# Patient Record
Sex: MALE | Race: WHITE | Employment: UNEMPLOYED | URBAN - METROPOLITAN AREA
[De-identification: names, ages, dates, MRNs, and addresses within clinical notes are randomized per-mention and may not be internally consistent; named-entity substitution may affect disease eponyms.]

---

## 2021-02-19 ENCOUNTER — HOSPITAL ENCOUNTER (INPATIENT)
Age: 51
LOS: 4 days | Discharge: HOME | DRG: 885 | End: 2021-02-23
Attending: PSYCHIATRY & NEUROLOGY | Admitting: PSYCHIATRY & NEUROLOGY
Payer: MEDICARE

## 2021-02-19 PROBLEM — F32.9 MAJOR DEPRESSIVE DISORDER: Status: ACTIVE | Noted: 2021-02-19

## 2021-02-19 LAB
GLUCOSE BLD STRIP.AUTO-MCNC: 167 MG/DL (ref 65–100)
SERVICE CMNT-IMP: ABNORMAL

## 2021-02-19 PROCEDURE — 2709999900 HC NON-CHARGEABLE SUPPLY

## 2021-02-19 PROCEDURE — 5A1D70Z PERFORMANCE OF URINARY FILTRATION, INTERMITTENT, LESS THAN 6 HOURS PER DAY: ICD-10-PCS | Performed by: INTERNAL MEDICINE

## 2021-02-19 PROCEDURE — 74011250636 HC RX REV CODE- 250/636: Performed by: INTERNAL MEDICINE

## 2021-02-19 PROCEDURE — 74011636637 HC RX REV CODE- 636/637: Performed by: NURSE PRACTITIONER

## 2021-02-19 PROCEDURE — 82962 GLUCOSE BLOOD TEST: CPT

## 2021-02-19 PROCEDURE — 74011250637 HC RX REV CODE- 250/637: Performed by: NURSE PRACTITIONER

## 2021-02-19 PROCEDURE — P9047 ALBUMIN (HUMAN), 25%, 50ML: HCPCS | Performed by: INTERNAL MEDICINE

## 2021-02-19 PROCEDURE — 65220000003 HC RM SEMIPRIVATE PSYCH

## 2021-02-19 PROCEDURE — U0003 INFECTIOUS AGENT DETECTION BY NUCLEIC ACID (DNA OR RNA); SEVERE ACUTE RESPIRATORY SYNDROME CORONAVIRUS 2 (SARS-COV-2) (CORONAVIRUS DISEASE [COVID-19]), AMPLIFIED PROBE TECHNIQUE, MAKING USE OF HIGH THROUGHPUT TECHNOLOGIES AS DESCRIBED BY CMS-2020-01-R: HCPCS

## 2021-02-19 PROCEDURE — 90935 HEMODIALYSIS ONE EVALUATION: CPT

## 2021-02-19 PROCEDURE — 74011250637 HC RX REV CODE- 250/637: Performed by: PSYCHIATRY & NEUROLOGY

## 2021-02-19 RX ORDER — ASPIRIN 81 MG/1
81 TABLET ORAL DAILY
COMMUNITY

## 2021-02-19 RX ORDER — TORSEMIDE 100 MG/1
100 TABLET ORAL DAILY
Status: DISCONTINUED | OUTPATIENT
Start: 2021-02-20 | End: 2021-02-23 | Stop reason: HOSPADM

## 2021-02-19 RX ORDER — DIPHENHYDRAMINE HYDROCHLORIDE 50 MG/ML
50 INJECTION, SOLUTION INTRAMUSCULAR; INTRAVENOUS
Status: DISCONTINUED | OUTPATIENT
Start: 2021-02-19 | End: 2021-02-23 | Stop reason: HOSPADM

## 2021-02-19 RX ORDER — METOPROLOL TARTRATE 25 MG/1
12.5 TABLET, FILM COATED ORAL 2 TIMES DAILY
COMMUNITY

## 2021-02-19 RX ORDER — SENNOSIDES 8.6 MG/1
1 TABLET ORAL DAILY
Status: DISCONTINUED | OUTPATIENT
Start: 2021-02-20 | End: 2021-02-23 | Stop reason: HOSPADM

## 2021-02-19 RX ORDER — GABAPENTIN 100 MG/1
100 CAPSULE ORAL 2 TIMES DAILY
COMMUNITY

## 2021-02-19 RX ORDER — MIDODRINE HYDROCHLORIDE 5 MG/1
10 TABLET ORAL
Status: DISCONTINUED | OUTPATIENT
Start: 2021-02-19 | End: 2021-02-23 | Stop reason: HOSPADM

## 2021-02-19 RX ORDER — METOPROLOL TARTRATE 25 MG/1
12.5 TABLET, FILM COATED ORAL 2 TIMES DAILY
Status: DISCONTINUED | OUTPATIENT
Start: 2021-02-20 | End: 2021-02-23 | Stop reason: HOSPADM

## 2021-02-19 RX ORDER — HYDROXYZINE 50 MG/1
50 TABLET, FILM COATED ORAL
Status: DISCONTINUED | OUTPATIENT
Start: 2021-02-19 | End: 2021-02-23 | Stop reason: HOSPADM

## 2021-02-19 RX ORDER — OLANZAPINE 5 MG/1
5 TABLET ORAL
Status: DISCONTINUED | OUTPATIENT
Start: 2021-02-19 | End: 2021-02-23 | Stop reason: HOSPADM

## 2021-02-19 RX ORDER — ADHESIVE BANDAGE
30 BANDAGE TOPICAL DAILY PRN
Status: DISCONTINUED | OUTPATIENT
Start: 2021-02-19 | End: 2021-02-23 | Stop reason: HOSPADM

## 2021-02-19 RX ORDER — BENZTROPINE MESYLATE 1 MG/1
1 TABLET ORAL
Status: DISCONTINUED | OUTPATIENT
Start: 2021-02-19 | End: 2021-02-23 | Stop reason: HOSPADM

## 2021-02-19 RX ORDER — ACETAMINOPHEN 500 MG
1000 TABLET ORAL
COMMUNITY
End: 2021-02-23

## 2021-02-19 RX ORDER — DICLOFENAC SODIUM 10 MG/G
2 GEL TOPICAL 4 TIMES DAILY
COMMUNITY

## 2021-02-19 RX ORDER — ASPIRIN 81 MG/1
81 TABLET ORAL DAILY
Status: DISCONTINUED | OUTPATIENT
Start: 2021-02-20 | End: 2021-02-23 | Stop reason: HOSPADM

## 2021-02-19 RX ORDER — INSULIN LISPRO 100 [IU]/ML
INJECTION, SOLUTION INTRAVENOUS; SUBCUTANEOUS
Status: DISCONTINUED | OUTPATIENT
Start: 2021-02-20 | End: 2021-02-23 | Stop reason: HOSPADM

## 2021-02-19 RX ORDER — HYDROCORTISONE 10 MG/1
5 TABLET ORAL EVERY EVENING
Status: DISCONTINUED | OUTPATIENT
Start: 2021-02-20 | End: 2021-02-23 | Stop reason: HOSPADM

## 2021-02-19 RX ORDER — HYDROCORTISONE 10 MG/1
10 TABLET ORAL DAILY
COMMUNITY

## 2021-02-19 RX ORDER — ATORVASTATIN CALCIUM 40 MG/1
80 TABLET, FILM COATED ORAL DAILY
Status: DISCONTINUED | OUTPATIENT
Start: 2021-02-20 | End: 2021-02-23 | Stop reason: HOSPADM

## 2021-02-19 RX ORDER — HYDROCORTISONE 10 MG/1
10 TABLET ORAL DAILY
Status: DISCONTINUED | OUTPATIENT
Start: 2021-02-20 | End: 2021-02-23 | Stop reason: HOSPADM

## 2021-02-19 RX ORDER — CALCIUM ACETATE 667 MG/1
2001 TABLET ORAL
Status: DISCONTINUED | OUTPATIENT
Start: 2021-02-20 | End: 2021-02-23 | Stop reason: HOSPADM

## 2021-02-19 RX ORDER — TESTOSTERONE 25 MG/2.5G
2.5 GEL TRANSDERMAL DAILY
COMMUNITY
End: 2021-02-23

## 2021-02-19 RX ORDER — ACETAMINOPHEN 325 MG/1
650 TABLET ORAL
Status: DISCONTINUED | OUTPATIENT
Start: 2021-02-19 | End: 2021-02-23 | Stop reason: HOSPADM

## 2021-02-19 RX ORDER — MIDODRINE HYDROCHLORIDE 10 MG/1
TABLET ORAL
COMMUNITY

## 2021-02-19 RX ORDER — ALBUMIN HUMAN 250 G/1000ML
12.5 SOLUTION INTRAVENOUS
Status: DISCONTINUED | OUTPATIENT
Start: 2021-02-19 | End: 2021-02-23 | Stop reason: HOSPADM

## 2021-02-19 RX ORDER — B,C/FOLIC/ZINC/SELENOMETH/D3/E 0.8-12.5MG
1 TABLET ORAL DAILY
COMMUNITY

## 2021-02-19 RX ORDER — ESCITALOPRAM OXALATE 20 MG/1
20 TABLET ORAL DAILY
Status: ON HOLD | COMMUNITY
End: 2021-02-22

## 2021-02-19 RX ORDER — ZOLPIDEM TARTRATE 5 MG/1
TABLET ORAL
COMMUNITY
End: 2021-02-23

## 2021-02-19 RX ORDER — TRAZODONE HYDROCHLORIDE 50 MG/1
50 TABLET ORAL
Status: DISCONTINUED | OUTPATIENT
Start: 2021-02-19 | End: 2021-02-23 | Stop reason: HOSPADM

## 2021-02-19 RX ORDER — PROMETHAZINE HYDROCHLORIDE 25 MG/1
25 TABLET ORAL
Status: ON HOLD | COMMUNITY
End: 2021-02-22

## 2021-02-19 RX ORDER — HALOPERIDOL 5 MG/ML
5 INJECTION INTRAMUSCULAR
Status: DISCONTINUED | OUTPATIENT
Start: 2021-02-19 | End: 2021-02-23 | Stop reason: HOSPADM

## 2021-02-19 RX ORDER — ATORVASTATIN CALCIUM 80 MG/1
80 TABLET, FILM COATED ORAL DAILY
COMMUNITY

## 2021-02-19 RX ORDER — CALCIUM ACETATE 667 MG
667 TABLET ORAL
COMMUNITY

## 2021-02-19 RX ORDER — TORSEMIDE 100 MG/1
100 TABLET ORAL DAILY
COMMUNITY

## 2021-02-19 RX ORDER — GABAPENTIN 400 MG/1
400 CAPSULE ORAL
Status: DISCONTINUED | OUTPATIENT
Start: 2021-02-20 | End: 2021-02-23 | Stop reason: HOSPADM

## 2021-02-19 RX ORDER — LORAZEPAM 2 MG/ML
1 INJECTION INTRAMUSCULAR
Status: DISCONTINUED | OUTPATIENT
Start: 2021-02-19 | End: 2021-02-23 | Stop reason: HOSPADM

## 2021-02-19 RX ORDER — DICLOFENAC SODIUM 10 MG/G
2 GEL TOPICAL 4 TIMES DAILY
Status: DISCONTINUED | OUTPATIENT
Start: 2021-02-20 | End: 2021-02-23 | Stop reason: HOSPADM

## 2021-02-19 RX ORDER — HYDROXYZINE PAMOATE 25 MG/1
25 CAPSULE ORAL
Status: ON HOLD | COMMUNITY
End: 2021-02-22

## 2021-02-19 RX ORDER — SENNOSIDES 8.6 MG/1
1 TABLET ORAL DAILY
Status: ON HOLD | COMMUNITY
End: 2021-02-22

## 2021-02-19 RX ORDER — GABAPENTIN 100 MG/1
200 CAPSULE ORAL DAILY
Status: DISCONTINUED | OUTPATIENT
Start: 2021-02-20 | End: 2021-02-23 | Stop reason: HOSPADM

## 2021-02-19 RX ORDER — GABAPENTIN 300 MG/1
300 CAPSULE ORAL
COMMUNITY

## 2021-02-19 RX ORDER — HYDROCORTISONE 10 MG/1
5 TABLET ORAL EVERY EVENING
COMMUNITY

## 2021-02-19 RX ORDER — MAGNESIUM SULFATE 100 %
4 CRYSTALS MISCELLANEOUS AS NEEDED
Status: DISCONTINUED | OUTPATIENT
Start: 2021-02-19 | End: 2021-02-23 | Stop reason: HOSPADM

## 2021-02-19 RX ORDER — DEXTROSE 50 % IN WATER (D50W) INTRAVENOUS SYRINGE
12.5-25 AS NEEDED
Status: DISCONTINUED | OUTPATIENT
Start: 2021-02-19 | End: 2021-02-23 | Stop reason: HOSPADM

## 2021-02-19 RX ORDER — MIDODRINE HYDROCHLORIDE 5 MG/1
10 TABLET ORAL
Status: DISCONTINUED | OUTPATIENT
Start: 2021-02-19 | End: 2021-02-19

## 2021-02-19 RX ADMIN — TRAZODONE HYDROCHLORIDE 50 MG: 50 TABLET ORAL at 22:59

## 2021-02-19 RX ADMIN — ALBUMIN (HUMAN) 12.5 G: 0.25 INJECTION, SOLUTION INTRAVENOUS at 19:54

## 2021-02-19 RX ADMIN — HUMAN INSULIN 15 UNITS: 100 INJECTION, SUSPENSION SUBCUTANEOUS at 23:58

## 2021-02-19 RX ADMIN — HYDROXYZINE HYDROCHLORIDE 50 MG: 50 TABLET, FILM COATED ORAL at 21:21

## 2021-02-19 RX ADMIN — MIDODRINE HYDROCHLORIDE 10 MG: 5 TABLET ORAL at 18:04

## 2021-02-19 RX ADMIN — GABAPENTIN 400 MG: 400 CAPSULE ORAL at 23:56

## 2021-02-19 RX ADMIN — ACETAMINOPHEN 650 MG: 325 TABLET ORAL at 18:45
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
## 2021-02-19 NOTE — PROGRESS NOTES
Problem: Depressed Mood (Adult/Pediatric)  Goal: *STG: Remains safe in hospital  Outcome: Progressing Towards Goal    Pt denies SI.       ADMISSION NOTE    Patient admitted under the care of Dr. David Padgett for depression. Admission Status: Voluntary     Reason for Admission: SI with a plan to OD. Pt reports having panic attacks daily. Pt reports dialysis causes him stress. Skin Assessment completed by: Aureliano Wisdom       [facility]       Nephrologist is here seeing pt.       2815: Pt receiving dialysis. 21 : Hospitalist Consult:   Reason for consult: H&P  Urgency: non-emergent  MD who received call: Dr. Alicia Lafleur    Response: notified     1900: Called and left a message notifying physical therapy pt has an order to be assessed. 1913: SUHA Berger informed writer to call and notify him when pt finishes with dialysis. 2103: Paged SUHA Berger.    2104: Notified Dirk Singleton pt finished with dialysis. 2126: NP is here for H&P.      2148: MRSA culture sent to lab. 2259: PRN trazodone given for c/o sleep.

## 2021-02-19 NOTE — PROCEDURES
[facility]  [phone]    Vitals   Pre   Post   Assessment   Pre   Post     Temp  Temp: 98.2 °F (36.8 °C) (02/19/21 1530)  97.8 LOC  Alert and oriented  Alert and oriented * 4   HR   Pulse (Heart Rate): 66 (02/19/21 1725) 62 Lungs   crackles  crackles   B/P   BP: (!) 158/80 (02/19/21 1725) 134/69 Cardiac   Regular   regular   Resp   Resp Rate: 18 (02/19/21 1725) 18 Skin   Warm and dry Warm and dry    Pain level  Pain Intensity 1: 0 (02/19/21 1334) 0 Edema  Lower ext    Lower ext   Orders:    Duration:   Start:    9388 End:     Total:      Dialyzer:   Dialyzer/Set Up Inspection: Kinza [place] (02/19/21 1725)   K Bath:   Dialysate K (mEq/L): 2 (02/19/21 1725)   Ca Bath:   Dialysate CA (mEq/L): 2.5 (02/19/21 1725)   Na/Bicarb:   Dialysate NA (mEq/L): 138 (02/19/21 1725)   Target Fluid Removal:   Goal/Amount of Fluid to Remove (mL): 5000 mL (02/19/21 1725)   Access     Type & Location:   RLA AVF: skin CDI. No s/s of infection. + B/T. No issues with cannulation or hemostasis. Running well at . Labs     Obtained/Reviewed   Critical Results Called   Date when labs were drawn-  Hgb-  No results found for: HGB, HGBEXT  K-  No results found for: K  Ca- No results found for: CA  Bun- No results found for: BUN  Creat- No results found for: CREA     Medications/ Blood Products Given     Name   Dose   Route and Time     albumin 12.5 gm AVF 1940             Blood Volume Processed (BVP):    78.2 liters Net Fluid   Removed:  2500     Comments   Time Out Done: 1715  Primary Nurse Rpt Pre:  Primary Nurse Rpt Post:  Pt Education:yes  Care Plan:Continue HD  Tx Summary:  L    Pt arrived to HD [address]. Consent signed & on file. SBAR received from Primary RN.  5017: Pt cannulated with 08E needles per policy & without issue. Labs drawn per request/ order. VSS. Dialysis Tx initiated.    1740 Patient stable lines secure  1755 Patient stable access visible  1810 Patient stable no change  1825 Patient sleeping no change  1840 Patient's bp low reduced uf rate. md ordered albumin  1855 Patient sleeping no change  1910 Patient stable access visible. 1925 Patient stable lines secure  1940 no change access visible  1955 Patient resting quietly   2010 Patient stated body was hurting and wanted to cut treatment time. 2030 Patient stable but still wants to cut time. 2045: Tx ended. Patient ended treatment due to body pain. VSS. All possible blood returned to patient. Hemostasis achieved without issue. Bed locked and in the lowest position, call bell and belongings in reach. SBAR given to Primary, RN. Patient is stable at time of their/ my departure. All Dialysis related medications have been reviewed. Admiting Diagnosis:  Pt's previous clinic-  Consent signed - Informed Consent Verified: Yes (02/19/21 1725)  [facility] Consent -   Hepatitis Status- unknown  Machine #- Machine Number: [id number] [facility] (02/19/21 1725)  Telemetry status-  Pre-dialysis wt. - Statement Selected

## 2021-02-19 NOTE — CONSULTS
[facility]   [address] [facility], [address] [place] [address], [facility]  Phone: [phone]   [phone]       Nephrology Progress Note  Ethan Bella     1970     [id number]  Date of Admission : 2/19/2021 02/19/21    CC: Follow up for ESRD       Assessment and Plan   ESRD-HD:  - 2/2 Diabetic nephropathy   - Dialyzes MWF @ [facility] dialysis. F/b Dr Marina Myers   - Has R forearm AV fistula for access. - Volume overloaded on exam and 5 kg over dry weight. - [facility] notified about dialysis for today  - Labs to be done prior to dialysis    Anemia in CKD   - Labs pending     [facility]   - continue Home meds     HTN :  - continue Home meds     BiPolar disorder  - per psych team     Type II DM     D/w pt      Interval History:  Mr. Ethan Brito is a 70-year-old  male with a past medical history of longstanding type 2 diabetes mellitus and hypertension who has developed end-stage kidney disease secondary to diabetic nephropathy. He has been dialysis dependent for the past year. He has been followed by Dr. Koreen Sandifer at [facility] nephrology. He undergoes dialysis Monday Wednesday Friday at [facility]) dialysis clinic. His last dialysis was on Wednesday and was uneventful. He has a right forearm AV fistula for access. He denies any complications related to ESRD. He has been declined for a kidney transplant at [facility] due to his mental health issues. He is currently being worked up for a kidney transplant at [facility]. Prior to this admission patient reports having a mental breakdown. He has issues with volume overload but has not required any additional ultrafiltration treatments. Review of Systems: A comprehensive review of systems was negative except for that written in the HPI.     Current Medications:   Current Facility-Administered Medications   Medication Dose Route Frequency    OLANZapine (ZyPREXA) tablet 5 mg  5 mg Oral Q6H PRN    haloperidol lactate (HALDOL) injection 5 mg  5 mg IntraMUSCular Q6H PRN    benztropine (COGENTIN) tablet 1 mg  1 mg Oral BID PRN    diphenhydrAMINE (BENADRYL) injection 50 mg  50 mg IntraMUSCular BID PRN    hydrOXYzine HCL (ATARAX) tablet 50 mg  50 mg Oral TID PRN    LORazepam (ATIVAN) injection 1 mg  1 mg IntraMUSCular Q4H PRN    traZODone (DESYREL) tablet 50 mg  50 mg Oral QHS PRN    acetaminophen (TYLENOL) tablet 650 mg  650 mg Oral Q4H PRN    magnesium hydroxide (MILK OF MAGNESIA) 400 mg/5 mL oral suspension 30 mL  30 mL Oral DAILY PRN    midodrine (PROAMATINE) tablet 10 mg  10 mg Oral DIALYSIS MON, WED & FRI      No Known Allergies    Objective:  Vitals:    Vitals:    02/19/21 1258   BP: (!) 157/92   Pulse: 69   Resp: 16   Temp: 97.3 °F (36.3 °C)   SpO2: 97%   Weight: 130.6 kg (287 lb 14.4 oz)     Intake and Output:  No intake/output data recorded. No intake/output data recorded. Physical Examination:    General: Obese, appears stated age  Neck:  Supple, no mass  Resp:  Lungs CTA B/L, no wheezing , normal respiratory effort  CV:  RRR,  systolic murmur, 1+ pitting LE edema  GI:  Soft, NT, + Bs  Neurologic:  Non focal  Skin:  No Rash  Access: Right forearm AV fistula positive thrill    []    High complexity decision making was performed  []    Patient is at high-risk of decompensation with multiple organ involvement    Lab Data Personally Reviewed: I have reviewed all the pertinent labs, microbiology data and radiology studies during assessment. No results for input(s): NA, K, CL, CO2, GLU, BUN, CREA, CA, MG, PHOS, ALB, TBIL, ALT, INR, INREXT in the last 72 hours. No lab exists for component: SGOT  No results for input(s): WBC, HGB, HCT, PLT, HGBEXT, HCTEXT, PLTEXT in the last 72 hours. No results found for: SDES  No results found for: CULT  No results found for this or any previous visit (from the past 24 hour(s)). Total time spent with patient:  xxx   min.                                Care Plan discussed with:  Patient     Family RN      Consulting Physician Roc Field        I have reviewed the flowsheets. Chart and Pertinent Notes have been reviewed. No change in PMH ,family and social history from Consult note.       Lucie Syed MD

## 2021-02-20 LAB
BACTERIA SPEC CULT: NORMAL
BACTERIA SPEC CULT: NORMAL
GLUCOSE BLD STRIP.AUTO-MCNC: 137 MG/DL (ref 65–100)
GLUCOSE BLD STRIP.AUTO-MCNC: 140 MG/DL (ref 65–100)
GLUCOSE BLD STRIP.AUTO-MCNC: 179 MG/DL (ref 65–100)
GLUCOSE BLD STRIP.AUTO-MCNC: 92 MG/DL (ref 65–100)
SARS-COV-2, COV2: NORMAL
SARS-COV-2, COV2: NOT DETECTED
SERVICE CMNT-IMP: ABNORMAL
SERVICE CMNT-IMP: NORMAL
SERVICE CMNT-IMP: NORMAL
SPECIMEN SOURCE, FCOV2M: NORMAL

## 2021-02-20 PROCEDURE — 97161 PT EVAL LOW COMPLEX 20 MIN: CPT

## 2021-02-20 PROCEDURE — GZHZZZZ GROUP PSYCHOTHERAPY: ICD-10-PCS | Performed by: PSYCHIATRY & NEUROLOGY

## 2021-02-20 PROCEDURE — 74011250637 HC RX REV CODE- 250/637: Performed by: NURSE PRACTITIONER

## 2021-02-20 PROCEDURE — 82962 GLUCOSE BLOOD TEST: CPT

## 2021-02-20 PROCEDURE — 65220000003 HC RM SEMIPRIVATE PSYCH

## 2021-02-20 PROCEDURE — 74011636637 HC RX REV CODE- 636/637: Performed by: NURSE PRACTITIONER

## 2021-02-20 RX ADMIN — Medication 1 CAPSULE: at 08:13

## 2021-02-20 RX ADMIN — HYDROXYZINE HYDROCHLORIDE 50 MG: 50 TABLET, FILM COATED ORAL at 15:20

## 2021-02-20 RX ADMIN — GABAPENTIN 200 MG: 100 CAPSULE ORAL at 08:15

## 2021-02-20 RX ADMIN — ASPIRIN 81 MG: 81 TABLET, COATED ORAL at 08:13

## 2021-02-20 RX ADMIN — Medication 2001 MG: at 06:39

## 2021-02-20 RX ADMIN — METOPROLOL TARTRATE 12.5 MG: 25 TABLET, FILM COATED ORAL at 17:28

## 2021-02-20 RX ADMIN — GABAPENTIN 400 MG: 400 CAPSULE ORAL at 21:35

## 2021-02-20 RX ADMIN — HYDROCORTISONE 5 MG: 10 TABLET ORAL at 17:28

## 2021-02-20 RX ADMIN — HUMAN INSULIN 15 UNITS: 100 INJECTION, SUSPENSION SUBCUTANEOUS at 21:33

## 2021-02-20 RX ADMIN — Medication 2001 MG: at 11:53

## 2021-02-20 RX ADMIN — DICLOFENAC SODIUM 2 G: 10 GEL TOPICAL at 17:30

## 2021-02-20 RX ADMIN — Medication 8.6 MG: at 08:13

## 2021-02-20 RX ADMIN — DICLOFENAC SODIUM 2 G: 10 GEL TOPICAL at 12:31

## 2021-02-20 RX ADMIN — TRAZODONE HYDROCHLORIDE 50 MG: 50 TABLET ORAL at 21:36

## 2021-02-20 RX ADMIN — INSULIN HUMAN 30 UNITS: 100 INJECTION, SUSPENSION SUBCUTANEOUS at 08:12

## 2021-02-20 RX ADMIN — Medication 2001 MG: at 15:36

## 2021-02-20 RX ADMIN — DICLOFENAC SODIUM 2 G: 10 GEL TOPICAL at 08:17

## 2021-02-20 RX ADMIN — ATORVASTATIN CALCIUM 80 MG: 40 TABLET, FILM COATED ORAL at 08:15

## 2021-02-20 RX ADMIN — METOPROLOL TARTRATE 12.5 MG: 25 TABLET, FILM COATED ORAL at 08:14

## 2021-02-20 RX ADMIN — HYDROCORTISONE 10 MG: 10 TABLET ORAL at 08:15

## 2021-02-20 RX ADMIN — TORSEMIDE 100 MG: 100 TABLET ORAL at 08:16

## 2021-02-20 NOTE — PROGRESS NOTES
[address] [facility] 60  Master Treatment Plan for [employer]    Date Treatment Plan Initiated: 02/20/2021    Treatment Plan Modalities:  Type of Modality Amount  (x minutes) Frequency (x/week) Duration (x days) Name of Responsible Staff   [address] [facility] meetings to encourage peer interactions 15 7 1 [place] JESUS     Group psychotherapy to assist in building coping skills and internal controls 60 7 1 Mei Evans   Therapeutic activity groups to build coping skills 60 7 1 Mei Evans   Psychoeducation in group setting to address:   Medication education   15 [address] PharmD   Coping skills   30 3 1 Mei Evans   Relaxation techniques         Symptom management         Discharge planning   60 2 [address] [facility]    60 2 1 Chaplain AGUIRRE   61 1 1 volunteer   Recovery/AA/NA      volunteer   Physician medication management   13 7 1 Dr. Jose Cyr meeting/discharge planning   15 2 1 Miguelina Rg and Elisha Hidalgo                                      Problem: Depressed Mood (Adult/Pediatric)  Goal: *STG: Participates in treatment plan  Outcome: Progressing Towards Goal  Note: Patient is participatory in treatment team. Active on the milieu; however, is minimally interactive with peers. States that he recently had a panic attack prior to meeting with treatment team. Endorses SI but denies any real need to kill self due to Gnosticism reasons. Dialysis is a major stressor and causes him physical pain, specifically knee pain, therefore skipped his last dialysis. States that [facility] [place]" will not give him medications for his pain because his kidneys are not functioning properly. Lives at home with his wife and daughter and this has caused marital stress as well as his wife is the main caretaker and financial provider now. Medication education was provided. Continue to encourage group attendance.   Goal: *STG: Remains safe in hospital  Outcome: Progressing Towards Goal  Goal: *STG: Complies with medication therapy  Outcome: Progressing Towards Goal  Goal: Interventions  Outcome: Progressing Towards Goal     Problem: Falls - Risk of  Goal: *Absence of Falls  Description: Document Edvin Fall Risk and appropriate interventions in the flowsheet.   Outcome: Progressing Towards Goal  Note: Fall Risk Interventions:  Mobility Interventions: Utilize walker, cane, or other assistive device         Medication Interventions: Teach patient to arise slowly

## 2021-02-20 NOTE — BH NOTES
PRN Medication Documentation    Specific patient behavior that led to need for PRN medication: \"can I have an anti-anxiety pill? \"  Staff interventions attempted prior to PRN being given: decreased stimulation  PRN medication given: Atarax  Patient response/effectiveness of PRN medication: will continue to monitor

## 2021-02-20 NOTE — BH NOTES
PSYCHOSOCIAL ASSESSMENT  :Patient identifying info: Zipporah Epley is a 45 y.o., male admitted 2/19/2021 12:51 PM     Presenting problem and precipitating factors: Patient was admitted to the ED at [facility] for SI with plant to OD. Precipitating factors include the stress of dialysis. Patient experienced an increase in depression over the past few weeks because of the increase in physical illness from missing dialysis. In treatment team, he said he would not kill himself but the SI was a \"holler for help. \" Denied self-harm in the past. He said his \"herminio\" would never let him hurt himself. Mental status assessment: Alert and oriented. Mood is depressed. Speech is pressured. He reports having a hard time sitting through dialysis because it hurts his knees. Strengths: Patient has a supportive wife and home to return to. Collateral information: KELVIN signed 2-20-21 for wife, Macho Coughlin ([phone])    Current psychiatric /substance abuse providers and contact info: none noted     Previous psychiatric/substance abuse providers and response to treatment: none noted     Family history of mental illness or substance abuse: pt's sister had substance abuse      Substance abuse history:    Social History     Tobacco Use    Smoking status: Former Smoker    Smokeless tobacco: Never Used   Substance Use Topics    Alcohol use: Not Currently       History of biomedical complications associated with substance abuse: none noted     Patient's current acceptance of treatment or motivation for change: voluntary     Family constellation: Patient is  and has two children 22 and 16. Is significant other involved?  Yes       Describe support system: wife, Juju Manzano and children    Describe living arrangements and home environment: patient lives with wife and 16year old daughter     Health issues:   Hospital Problems  Never Reviewed          Codes Class Noted POA    Major depressive disorder ICD-10-CM: F32.9  ICD-9-CM: 296.20  2021 Unknown              Trauma history: none noted    Legal issues: none noted    History of  service: none noted    Financial status: SSDI     Anglican/cultural factors: none noted     Education/work history: none noted    Have you been licensed as a health care professional (current or ): no    Leisure and recreation preferences: none noted     Describe coping skills: limited, ineffectual     Mei Evans  2021

## 2021-02-20 NOTE — BH NOTES
GROUP THERAPY PROGRESS NOTE    Patient is participating in Self-Care Group. Group time: 60 minutes    Personal goal for participation: To practice self-care and relax      Goal orientation: Personal    Group therapy participation: active    Therapeutic interventions reviewed and discussed: Group members played various games [employer] and received treat/prizes if they won. Each patient was encouraged to socialize and interact with other group members. Impression of participation: Erna Rehman actively participated in group. Patient engaged in discussion and conversation. Cooperative in group. Mood remains depressed.      Mei Evans, Supervisee in Social Work
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
## 2021-02-20 NOTE — CONSULTS
Hospitalist H&P   Florentin Condon, LISETTE, YOON  Cell [phone] (7pm-7am)     Date of Service: 2/19/2021  Primary Care Provider: Randy Javed, Not On File  Source of Information: Patient, chart review    History of Presenting Illness:   Mis Brewer is a 45 y.o. male with past medical history of anemia of chronic disease, arthritis, diastolic congestive heart failure, coronary artery disease, neuropathy, ESRD on HD, hypertension, hyperlipidemia, adrenal insufficiency, obstructive sleep apnea, diabetes mellitus type 2 transferred to [facility] inpatient behavioral health unit from [facility] for inpatient management of suicidal ideation/depression. Patient was brought to the emergency department due to progressive feelings of depression, worsening over the past week due to physical complications from missing dialysis session. No reported plan or current self-harm. History of depression and anxiety. Documents from the transferring hospital have been reviewed and are summarized below  On the transferring emergency department, patient received medical screening examination including blood work, toxicity screen and EKG. These were grossly within normal limits given this patient's medical history. He received his home medications while in the emergency department. Patient was medically cleared for transfer to inpatient psychiatric facility by the emergency department physician. The hospitalist group is consulted for medical management. At present, patient denies any acute medical concerns, though states that he is frustrated with the overall status of his physical health. He reports missing an episode of dialysis last week due to poor weather and feels that he has had progressive volume overload since then. Symptoms of volume overload as described by the patient include shortness of breath and edema. Otherwise, he denies any recent changes in his medication regimen or health.   He states that he has regular follow-up with his PCP, cardiologist, and nephrologist.    Tobacco use: Former, 18 years ago  Alcohol use: Denies at present, last greater than 1 year ago  Illicit drug use: Denies     Assessment & Plan     ESRD on HD  -HD Monday Wednesday Friday at [facility] nephrologist Dr. Justin Glass  -Missed one episode hemodialysis about a week ago due to weather  -Dialyzed upon arrival to unit 2/19/2021  -Nephrology following, appreciate assistance    Hypertension  -On metoprolol 12.5 mg twice daily at home, resume  -Episode of hypotension with dialysis requiring midodrine and albumin, will hold this evening's dose of metoprolol    CAD, history of  -Stable, no chest pain at present  -Continue home aspirin, beta-blocker    Hyperlipidemia  -Continue home statin    Diabetes mellitus type 2, insulin-dependent  -Sliding scale insulin,ACHS Accu-Cheks, hypoglycemia protocol  -Continue home basal dose insulin    Diabetic neuropathy  -Continue home gabapentin twice daily    Adrenal insufficiency  -Continue home hydrocortisone    Secondary hyperparathyroidism  -Continue home medications    Anemia of chronic disease  -Hemoglobin at transferring facility 11, labs pending  -No active bleeding, continue to monitor    Obstructive sleep apnea  -Home CPAP while in hospital    Osteoarthritis, history of  -Continue home Voltaren gel, Tylenol as needed    Depression/anxiety/suicidal ideations  -Per primary team    DVT Prophylaxis: Up ad adan, none indicated  Code status: Full  Disposition: Per primary team    Thank you for this consultation, we will follow along with you to ensure his home medication regimen is resumed and he does not have any further episodes of hypotension. Routine labs were not drawn during hemodialysis, ordered for the morning. If he remains hemodynamically stable and there are no significant lab abnormalities, we will likely sign off at that time.      Review of Systems:  Review of Systems   Constitutional: Negative for chills, fever and malaise/fatigue. Reports five pound weight gain   HENT: Negative for congestion and sore throat. Respiratory: Negative for cough, shortness of breath and wheezing. Cardiovascular: Positive for leg swelling. Negative for chest pain, palpitations and orthopnea. Gastrointestinal: Positive for diarrhea. Negative for abdominal pain, blood in stool, constipation, heartburn, melena, nausea and vomiting. Genitourinary: Negative for dysuria, flank pain, frequency, hematuria and urgency. Musculoskeletal: Positive for back pain and joint pain (Chronic knee pain). Skin:        Healing wound to left second toe   Neurological: Negative for dizziness, weakness and headaches. Psychiatric/Behavioral: Positive for depression, substance abuse and suicidal ideas. The patient is nervous/anxious and has insomnia. Past Medical History:   Diagnosis Date    Adrenal insufficiency (HCC)     Anemia     Anxiety     Arthritis     Atrial fibrillation (HCC)     Bell's palsy     CAD (coronary artery disease)     Depression     Diabetes mellitus (HCC)     Diabetic neuropathy (HCC)     Diastolic heart failure (HCC)     [facility]     HTN (hypertension)     Hyperlipidemia     [facility]opit[facility]ism ([facility]     Morbid obesity ([facility]     Myocardial infarction ([facility]     NORAH (obstructive sleep apnea)       Past Surgical History:   Procedure Laterality Date    HX AMPUTATION TOE      Left great toe    HX ARTERIOVENOUS FISTULA      HX CATARACT REMOVAL      HX CORONARY ARTERY BYPASS GRAFT       Prior to Admission medications    Medication Sig Start Date End Date Taking? Authorizing Provider   aspirin delayed-release 81 mg tablet Take 81 mg by mouth daily. Yes Provider, Historical   calcium acetate (Calphron) 667 mg tab tablet Take 2,001 mg by mouth three (3) times daily (with meals).    Yes Provider, Historical   diclofenac (Voltaren) 1 % gel Apply 2 g to affected area four (4) times daily. Yes Provider, Historical   escitalopram oxalate (LEXAPRO) 20 mg tablet Take 20 mg by mouth daily. Yes Provider, Historical   gabapentin (NEURONTIN) 100 mg capsule Take  by mouth two (2) times daily (with meals). Breakfast and lunch   Yes Provider, Historical   gabapentin (NEURONTIN) 300 mg capsule Take 300 mg by mouth nightly. Yes Provider, Historical   insulin NPH (HumuLIN N NPH U-100 Insulin) 100 unit/mL injection 15 Units by SubCUTAneous route nightly. Yes Provider, Historical   insulin NPH (HumuLIN N NPH U-100 Insulin) 100 unit/mL injection 30 Units by SubCUTAneous route daily. Yes Provider, Historical   hydrocortisone (CORTEF) 10 mg tablet Take 5 mg by mouth every evening. Yes Provider, Historical   hydrOXYzine pamoate (VISTARIL) 25 mg capsule Take 25 mg by mouth four (4) times daily as needed for Anxiety. Yes Provider, Historical   hydrocortisone (CORTEF) 10 mg tablet Take 10 mg by mouth daily. Yes Provider, Historical   atorvastatin (Lipitor) 80 mg tablet Take 80 mg by mouth daily. Yes Provider, Historical   metoprolol tartrate (LOPRESSOR) 25 mg tablet Take 12.5 mg by mouth two (2) times a day. Yes Provider, Historical   midodrine (PROAMATINE) 10 mg tablet Take  by mouth three (3) times daily as needed. Yes Provider, Historical   Omega-3 Fatty Acids 60- mg cpDR Take 1 Tab by mouth daily. Yes Provider, Historical   promethazine (PHENERGAN) 25 mg tablet Take 25 mg by mouth every four (4) hours as needed for Nausea. Yes Provider, Historical   vit B,C-FA-zinc-selen-vit D3-E (RenaPlex-D) 800 mcg-12.5 mg -2,000 unit tab Take 1 Tab by mouth daily. Yes Provider, Historical   senna (Senna) 8.6 mg tablet Take 1 Tab by mouth daily. Yes Provider, Historical   testosterone (ANDROGEL) 1 % (25 mg/2.5gram) glpk 2.5 g by TransDERmal route daily. Yes Provider, Historical   torsemide (DEMADEX) 100 mg tablet Take 100 mg by mouth daily.    Yes Provider, Historical   acetaminophen (TYLENOL) 500 mg tablet Take 1,000 mg by mouth two (2) times daily as needed for Pain. Yes Provider, Historical   zolpidem (Ambien) 5 mg tablet Take  by mouth nightly as needed for Sleep. Yes Provider, Historical     No Known Allergies   Family History   Problem Relation Age of Onset    Heart Attack Father     Kidney Disease Sister         SOCIAL HISTORY:  Patient resides:  Independently [x]   Assisted Living []   SNF []   With family care []      Smoking history:   None []   Former [x]   Chronic []     Alcohol history:   None [x]   Social []   Chronic []     Ambulates:   Independently [x]   W/cane []   W/walker []   W/wc []     CODE STATUS:  DNR []   Full [x]   Other []     Objective:   Physical Exam:   Visit Vitals  /69   Pulse 65   Temp 98.2 °F (36.8 °C)   Resp 18   Wt 130.6 kg (287 lb 14.4 oz)   SpO2 98%           Physical Exam  Vitals signs and nursing note reviewed. Constitutional:       General: He is not in acute distress. Appearance: He is well-developed. He is obese. He is not ill-appearing or diaphoretic. HENT:      Head: Normocephalic and atraumatic. Mouth/Throat:      Mouth: Mucous membranes are moist.   Eyes:      General:         Right eye: No discharge. Left eye: No discharge. Extraocular Movements: Extraocular movements intact. Conjunctiva/sclera: Conjunctivae normal.      Pupils: Pupils are equal, round, and reactive to light. Neck:      Musculoskeletal: Normal range of motion. No neck rigidity or muscular tenderness. Cardiovascular:      Rate and Rhythm: Normal rate and regular rhythm. Pulses: Normal pulses. Heart sounds: Normal heart sounds. No murmur. No friction rub. No gallop. Pulmonary:      Effort: Pulmonary effort is normal. No respiratory distress. Breath sounds: Normal breath sounds. No wheezing or rales. Abdominal:      General: Bowel sounds are normal. There is no distension.       Palpations: Abdomen is soft. Tenderness: There is no abdominal tenderness. Musculoskeletal: Normal range of motion. General: No tenderness or deformity. Right lower leg: Edema (Non pitting bilateral lower extremity edema) present. Left lower leg: Edema present. Skin:     General: Skin is warm and dry. Capillary Refill: Capillary refill takes less than 2 seconds. Coloration: Skin is not pale. Findings: No erythema or rash. Neurological:      General: No focal deficit present. Mental Status: He is alert and oriented to person, place, and time. Mental status is at baseline. Psychiatric:         Behavior: Behavior normal.         Thought Content: Thought content normal.         Judgment: Judgment normal.      Comments: Calm, good health historian         Data Review:   Lab results (past 7 days)  Obtained 2/18/2021 0310 a.m.  CMP: Sodium 138, potassium 4.5, chloride 97, CO2 27, gap 14, calcium 8.8, glucose 199, BUN 35, creatinine 6.7, protein 8.0, albumin 3.6, alk phos 135, AST 12, ALT 15  TSH: 2.33  CBC: WBC 8.4K, hemoglobin 11.5, platelet 918  SARS negative  Acetaminophen 4.1  Salicylic acid undetected  Alcohol undetected    Imaging results (past 24 hours):  No results found. EKG (most recent):   Obtained 2/18/2021, read by myself  Normal sinus rhythm rate 68, prolonged QTC.   No ST elevation or depression noted    Signed By: Mike Devlin NP     February 19, 2021

## 2021-02-20 NOTE — PROGRESS NOTES
PHYSICAL THERAPY EVALUATION/DISCHARGE  Patient: Adin Vigil (89 y.o. male)  Date: 2/20/2021  Primary Diagnosis: Major depressive disorder [F32.9]       Precautions:          ASSESSMENT  Based on the objective data described below, the patient presents with overall baseline mobility as seen with sit <> stand, gait. Reports no issues with bed mobility. He is walking the unit on his own with standard walker but I provided a RW which is what he has at home and makes for much more fluid gait and less energy consumption. His balance is impaired as he has neuropathy and was receiving home health prior to admission. He has all necessary equipment at home and recommend resumption of therapy at time of discharge. I did instruct him to use his walker here and at home at all times to prevent falls as without assistive device he is quite unsteady. Acute skilled PT is not indicated. .    Functional Outcome Measure: The patient scored 18/28 on the Tinetti outcome measure which is indicative of  Fall risk but with walker much more stable. Other factors to consider for discharge: needs home health PT resumed at discharge     Further skilled acute physical therapy is not indicated at this time. PLAN :  Recommendation for discharge: (in order for the patient to meet his/her long term goals)  Physical therapy at least 2 days/week in the home     This discharge recommendation:  Has not yet been discussed the attending provider and/or case management    IF patient discharges home will need the following DME: patient owns DME required for discharge       SUBJECTIVE:   Patient stated I need a walker with wheels. This one tires me more.     OBJECTIVE DATA SUMMARY:   HISTORY:    Past Medical History:   Diagnosis Date    [facility]     Anemia     Anxiety     Arthritis     Atrial fibrillation ([facility]     Bell's palsy     CAD (coronary artery disease)     Depression     Diabetes mellitus ([facility]     Diabetic neuropathy (HCC)     Diastolic heart failure (HCC)     [facility]     HTN (hypertension)     Hyperlipidemia     Hypopituitarism ([facility]     Morbid obesity (HCC)     [facility]     NORAH (obstructive sleep apnea)      Past Surgical History:   Procedure Laterality Date    HX AMPUTATION TOE      Left great toe    HX ARTERIOVENOUS FISTULA      HX CATARACT REMOVAL      HX CORONARY ARTERY BYPASS GRAFT         Prior level of function: modified independent with cane or walker  Personal factors and/or comorbidities impacting plan of care:     Home Situation  Home Environment: Private residence  # Steps to Enter: 0  One/Two Story Residence: One story  Living Alone: No  Support Systems: Family member(s), Spouse/Significant Other/Partner  Patient Expects to be Discharged to[de-identified] Private residence  Current DME Used/Available at Home: [place], straight, Walker, rolling, Other (comment)(scooter)    EXAMINATION/PRESENTATION/DECISION MAKING:   Critical Behavior:   alert; oriented cooperative           Hearing: Auditory  Auditory Impairment: None  Range Of Motion:  AROM: Within functional limits                       Strength:    Strength: Within functional limits                    Tone & Sensation:   Tone: Normal              Sensation: Impaired(LE neuropathy)               Coordination:  Coordination: Within functional limits  Vision:      Functional Mobility:     Transfers:  Sit to Stand: Modified independent  Stand to Sit: Modified independent                       Balance:   Sitting: Intact  Standing: Impaired; Without support(intact with support)  Standing - Static: Good  Standing - Dynamic : Fair  Ambulation/Gait Training:  Distance (ft): 150 Feet (ft)  Assistive Device: Gait belt;Walker, rolling  Ambulation - Level of Assistance: Supervision(without AD; modified independent with RW)     Gait Description (WDL): Exceptions to WDL  Gait Abnormalities: Decreased step clearance; Path deviations              Speed/Kamilah: Slow  Step Length: Right shortened;Left shortened            Functional Measure:  Tinetti test:    Sitting Balance: 1  Arises: 1  Attempts to Rise: 2  Immediate Standing Balance: 1  Standing Balance: 1  Nudged: 1  Eyes Closed: 0  Turn 360 Degrees - Continuous/Discontinuous: 0  Turn 360 Degrees - Steady/Unsteady: 0  Sitting Down: 2  Balance Score: 9 Balance total score  Indication of Gait: 1  R Step Length/Height: 1  L Step Length/Height: 1  R Foot Clearance: 1  L Foot Clearance: 1  Step Symmetry: 1  Step Continuity: 1  Path: 1  Trunk: 0  Walking Time: 1  Gait Score: 9 Gait total score  Total Score: 18/28 Overall total score         Tinetti Tool Score Risk of Falls  <19 = High Fall Risk  19-24 = Moderate Fall Risk  25-28 = Low Fall Risk  Tinetti ME. Performance-Oriented Assessment of Mobility Problems in Elderly Patients. [facility]; [id number].  (Scoring Description: PT Bulletin Feb. 10, 1993)    Older adults: Alma Delia Ordonez et al, 2009; n = 1000 [facility] elderly evaluated with ABC, DIEUDONNE, ADL, and IADL)  · Mean DIEUDONNE score for males aged 69-68 years = 26.21(3.40)  · Mean DIEUDONNE score for females age 69-68 years = 25.16(4.30)  · Mean DIEUDONNE score for males over 80 years = 23.29(6.02)  · Mean DIEUDONNE score for females over 80 years = 17.20(8.32)            Physical Therapy Evaluation Charge Determination   History Examination Presentation Decision-Making   MEDIUM  Complexity : 1-2 comorbidities / personal factors will impact the outcome/ POC  LOW Complexity : 1-2 Standardized tests and measures addressing body structure, function, activity limitation and / or participation in recreation  LOW Complexity : Stable, uncomplicated        Based on the above components, the patient evaluation is determined to be of the following complexity level: LOW     Pain Ratin    Activity Tolerance:   Good      After treatment patient left in no apparent distress:   Sitting in chair    COMMUNICATION/EDUCATION:   The patients plan of care was discussed with: Registered nurse.          Thank you for this referral.  Keagan King, PT   Time Calculation: 15 mins

## 2021-02-20 NOTE — PROGRESS NOTES
[facility]  Hospitalist Group                                                                                          Hospitalist Progress Note  Mundo Fuentes NP  Answering service: [phone] OR [phone] from in house phone        Date of Service:  2021  NAME:  Lory Gomez  :  1970  MRN:  [id number]      Admission Summary:   Lory Gomez is a 45 y.o. male with a PMH of Hx MI, Anemia of chronic disease, Major Depression, Anxiety, Osteoarthritis, Diastolic CHF, CAD, Neuropathy, ESRD on HD, HTN, HLD, Hypopituitarism /Adrenal Insufficiency, NORAH and T2DM who was transferred to [address] [place] [address] from [facility] AND [facility] for management of Major Depression w/ SI's. Patient was taken to ED for worsening feelings of depression x1 week 2 to physical complications from missing dialysis session (d/t weather). After complete work- up deemed stable for transfer to [address] .     The patient stated he feels he has progressive volume overload since missed dialysis, c/o SOB and Edema. He denied any other recent changes in his medication regimen or health. He stated he has regular f/u's with his PCP, cardiologist, and nephrologist.    Interval history / Subjective: Follow-up for issues listed below.   -Patient seen and examined, no c/o's. Assessment & Plan:     ESRD on HD: MWF at [facility]), Dr. Tori Carlson  -Missed one episode hemodialysis about a week ago due to weather  -Dialyzed 2021  -Nephrology following     HTN: hypotension with dialysis requiring midodrine and albumin  -continue home BB, hold for bradycardia/hypotension    CAD/HLD: denied CP, continue home aspirin, BB and statin. T2DM: ISS, basal, ACHS Accu-Cheks, hypoglycemia protocol. Hga1c: 11.  Diabetic Neuropathy: continue home gabapentin. Hypopituitarism/Adrenal insufficiency: continue home hydrocortisone, monitor BP. Secondary Hyperparathyroidism: continue home medications. TSH: pending.   Anemia of Chronic Disease: no active bleeding, continue to monitor Hgb. NORAH: continue home CPAP. Osteoarthritis: continue home Voltaren gel, Tylenol as needed. Major Depression/Anxiety/SI's: managed per Psych team.    DVTppx: up ad adan  GIppx: Pepcid  Code Status: Full Code  Diet: Diabetic   Activity: up ad adan  Discharge: TBD  Ambulates: walker     Hospital Problems  Never Reviewed          Codes Class Noted POA    Major depressive disorder ICD-10-CM: F32.9  ICD-9-CM: 296.20  2/19/2021 Unknown                Review of Systems:   A comprehensive review of systems was negative except for that written in the HPI. Vital Signs:    Last 24hrs VS reviewed since prior progress note. Most recent are:  Visit Vitals  BP (!) 148/85   Pulse 65   Temp 98.2 °F (36.8 °C)   Resp 18   Wt 130.6 kg (287 lb 14.4 oz)   SpO2 96%         Intake/Output Summary (Last 24 hours) at 2/20/2021 0844  Last data filed at 2/19/2021 2045  Gross per 24 hour   Intake    Output 2500 ml   Net -2500 ml        Physical Examination:     I had a face to face encounter with this patient and independently examined them on 2/20/2021 as outlined below:    Constitutional:  No acute distress, cooperative, pleasant    ENT:  Oral mucosa moist.    Resp:  CTA bilaterally. No wheezing/rhonchi/rales. No accessory muscle use. CV:  Regular rhythm, normal rate, S1,S2.    GI/:  Soft, non distended, non tender, no guarding, BS present. Voids Freely. Musculoskeletal:  BLE edema, warm, 2+ pulses throughout. Neurologic:  Moves all extremities. LUE dialysis site. AAOx3, CN II-XII reviewed. Skin:  Left 2nd digit ulcers, amputated left great toe. Psych:  Poor insight, substance abuse, SI's, anxiety. Data Review:    Review and/or order of clinical lab test      Labs:   No results for input(s): WBC, HGB, HCT, PLT, HGBEXT, HCTEXT, PLTEXT in the last 72 hours. No results for input(s): NA, K, CL, CO2, BUN, CREA, GLU, CA, MG, PHOS, URICA in the last 72 hours.   No results for input(s): ALT, AP, TBIL, TBILI, TP, ALB, GLOB, GGT, AML, LPSE in the last 72 hours. No lab exists for component: SGOT, GPT, AMYP, HLPSE  No results for input(s): INR, PTP, APTT, INREXT in the last 72 hours. No results for input(s): FE, TIBC, PSAT, FERR in the last 72 hours. No results found for: FOL, RBCF   No results for input(s): PH, PCO2, PO2 in the last 72 hours. No results for input(s): CPK, CKNDX, TROIQ in the last 72 hours.     No lab exists for component: CPKMB  No results found for: CHOL, CHOLX, CHLST, CHOLV, HDL, HDLP, LDL, LDLC, DLDLP, TGLX, TRIGL, TRIGP, CHHD, CHHDX  Lab Results   Component Value Date/Time    Glucose (POC) 92 02/20/2021 07:45 AM    Glucose (POC) 167 (H) 02/19/2021 04:22 PM     No results found for: COLOR, APPRN, SPGRU, REFSG, GABE, PROTU, GLUCU, KETU, BILU, UROU, SONJA, LEUKU, GLUKE, EPSU, BACTU, WBCU, RBCU, CASTS, UCRY      Medications Reviewed:     Current Facility-Administered Medications   Medication Dose Route Frequency    OLANZapine (ZyPREXA) tablet 5 mg  5 mg Oral Q6H PRN    haloperidol lactate (HALDOL) injection 5 mg  5 mg IntraMUSCular Q6H PRN    benztropine (COGENTIN) tablet 1 mg  1 mg Oral BID PRN    diphenhydrAMINE (BENADRYL) injection 50 mg  50 mg IntraMUSCular BID PRN    hydrOXYzine HCL (ATARAX) tablet 50 mg  50 mg Oral TID PRN    LORazepam (ATIVAN) injection 1 mg  1 mg IntraMUSCular Q4H PRN    traZODone (DESYREL) tablet 50 mg  50 mg Oral QHS PRN    acetaminophen (TYLENOL) tablet 650 mg  650 mg Oral Q4H PRN    magnesium hydroxide (MILK OF MAGNESIA) 400 mg/5 mL oral suspension 30 mL  30 mL Oral DAILY PRN    midodrine (PROAMATINE) tablet 10 mg  10 mg Oral DIALYSIS MON, WED & FRI    albumin human 25% (BUMINATE) solution 12.5 g  12.5 g IntraVENous DIALYSIS PRN    aspirin delayed-release tablet 81 mg  81 mg Oral DAILY    atorvastatin (LIPITOR) tablet 80 mg  80 mg Oral DAILY    calcium acetate (phosphate binder) (PHOSLO) tablet 2,001 mg  2,001 mg Oral TIDAC    diclofenac (VOLTAREN) 1 % topical gel 2 g  2 g Topical QID    gabapentin (NEURONTIN) capsule 400 mg  400 mg Oral QHS    gabapentin (NEURONTIN) capsule 200 mg  200 mg Oral DAILY    hydrocortisone (CORTEF) tablet 5 mg  5 mg Oral QPM    hydrocortisone (CORTEF) tablet 10 mg  10 mg Oral DAILY    insulin NPH (NOVOLIN N, HUMULIN N) injection 15 Units  15 Units SubCUTAneous QHS    insulin NPH (NOVOLIN N, HUMULIN N) injection 30 Units  30 Units SubCUTAneous ACB    metoprolol tartrate (LOPRESSOR) tablet 12.5 mg  12.5 mg Oral BID    fish oil-omega-3 fatty acids 340-1,000 mg capsule 1 Cap  1 Cap Oral DAILY    senna (SENOKOT) tablet 8.6 mg  1 Tab Oral DAILY    torsemide (DEMADEX) tablet 100 mg  100 mg Oral DAILY    B complex-vitaminC-folic acid (NEPHROCAP) cap  1 Cap Oral DAILY    insulin lispro (HUMALOG) injection   SubCUTAneous AC&HS    glucose chewable tablet 16 g  4 Tab Oral PRN    dextrose (D50W) injection syrg 12.5-25 g  12.5-25 g IntraVENous PRN    glucagon (GLUCAGEN) injection 1 mg  1 mg IntraMUSCular PRN     ______________________________________________________________________  EXPECTED LENGTH OF STAY: - - -  ACTUAL LENGTH OF STAY:          1                 Farrah Walter NP

## 2021-02-20 NOTE — PROGRESS NOTES
Pt lying in bed, appears asleep. Respirations WNL. No distress noted. Will continue to monitor for safety. Problem: Falls - Risk of  Goal: *Absence of Falls  Description: Document Ansley Torres Fall Risk and appropriate interventions in the flowsheet.   Outcome: Progressing Towards Goal  Note: Fall Risk Interventions:  Mobility Interventions: Utilize walker, cane, or other assistive device         Medication Interventions: Teach patient to arise slowly            Sleep= 6

## 2021-02-20 NOTE — H&P
INITIAL PSYCHIATRIC EVALUATION    IDENTIFICATION:      A. Name: Иван Lamb Age:     45 y.o.      C.   MRN: [id number]       D.   CSN:      [id number]      E. Admission Date: 2021       F.   :     1970                REASON FOR HOSPITALIZATION:  The patient was admitted to the inpatient psychiatric unit with suicidal ideations, increased depression and chronic medical comorbidity. HISTORY OF PRESENT ILLNESS:   The patient Lokesh Moreau is a 45 y.o. . male with past medical history of anemia of chronic disease, arthritis, diastolic congestive heart failure, coronary artery disease, neuropathy, ESRD on HD, hypertension, hyperlipidemia, adrenal insufficiency, obstructive sleep apnea, diabetes mellitus type 2 transferred to [facility] for inpatient management of suicidal ideation/depression. Patient was brought to the emergency department due to progressive feelings of depression, worsening over the past week due to physical complications from missing dialysis session. No reported plan or current self-harm. History of depression and anxiety. PAST MEDICAL HISTORY:       A. Psychiatric Disorders/Symptoms:   Major Depressive Disorder  Generalized Anxiety Disorder  Panic Disorder                  B. Substance Abuse:   Denies                 MEDICATIONS:     Prior to Admission Medications   Prescriptions Last Dose Informant Patient Reported? Taking? Omega-3 Fatty Acids 60- mg cpDR   Yes Yes   Sig: Take 1 Tab by mouth daily. acetaminophen (TYLENOL) 500 mg tablet   Yes Yes   Sig: Take 1,000 mg by mouth two (2) times daily as needed for Pain. aspirin delayed-release 81 mg tablet   Yes Yes   Sig: Take 81 mg by mouth daily. atorvastatin (Lipitor) 80 mg tablet   Yes Yes   Sig: Take 80 mg by mouth daily.    calcium acetate (Calphron) 667 mg tab tablet   Yes Yes   Sig: Take 2,001 mg by mouth three (3) times daily (with meals). diclofenac (Voltaren) 1 % gel   Yes Yes   Sig: Apply 2 g to affected area four (4) times daily. escitalopram oxalate (LEXAPRO) 20 mg tablet   Yes Yes   Sig: Take 20 mg by mouth daily. gabapentin (NEURONTIN) 100 mg capsule   Yes Yes   Sig: Take 200 mg by mouth daily. gabapentin (NEURONTIN) 300 mg capsule   Yes Yes   Sig: Take 400 mg by mouth nightly. hydrOXYzine pamoate (VISTARIL) 25 mg capsule   Yes Yes   Sig: Take 25 mg by mouth four (4) times daily as needed for Anxiety. hydrocortisone (CORTEF) 10 mg tablet   Yes Yes   Sig: Take 5 mg by mouth every evening. hydrocortisone (CORTEF) 10 mg tablet   Yes Yes   Sig: Take 10 mg by mouth daily. insulin NPH (HumuLIN N NPH U-100 Insulin) 100 unit/mL injection   Yes Yes   Sig: 15 Units by SubCUTAneous route nightly. insulin NPH (HumuLIN N NPH U-100 Insulin) 100 unit/mL injection   Yes Yes   Si Units by SubCUTAneous route daily. metoprolol tartrate (LOPRESSOR) 25 mg tablet   Yes Yes   Sig: Take 12.5 mg by mouth two (2) times a day. midodrine (PROAMATINE) 10 mg tablet   Yes Yes   Sig: Take  by mouth three (3) times daily as needed. promethazine (PHENERGAN) 25 mg tablet   Yes Yes   Sig: Take 25 mg by mouth every four (4) hours as needed for Nausea. senna (Senna) 8.6 mg tablet   Yes Yes   Sig: Take 1 Tab by mouth daily. testosterone (ANDROGEL) 1 % (25 mg/2.5gram) glpk   Yes Yes   Si.5 g by TransDERmal route daily. torsemide (DEMADEX) 100 mg tablet   Yes Yes   Sig: Take 100 mg by mouth daily. vit B,C-FA-zinc-selen-vit D3-E (RenaPlex-D) 800 mcg-12.5 mg -2,000 unit tab   Yes Yes   Sig: Take 1 Tab by mouth daily. zolpidem (Ambien) 5 mg tablet   Yes Yes   Sig: Take  by mouth nightly as needed for Sleep. Facility-Administered Medications: None         ALLERGIES:   He has No Known Allergies. SOCIAL HISTORY:  Currently lives at home with wife and daughter who is 16years old.   Currently on disability due to chronic kidney failure and dialysis. Reports that wife is still working full time and he is completely dependant on her. FAMILY HISTORY:  Sister: Substance abuse history       REVIEW OF SYSTEMS:  Patient currently denies suicidal and homicidal ideation. Pt reports depression and anxiety, panic attacks. Pt denies auditory and visual hallucinations. Medical review of systems mainly considered within normal limits expect as noted in history above. MENTAL STATUS EXAM:  Sensorium  oriented to time, place and person   Orientation person, place, time/date, situation, day of week, month of year and year   Relations cooperative   Eye Contact appropriate   Appearance:  disheveled   Motor Behavior:  within normal limits   Speech:  normal pitch, normal volume and non-pressured   Vocabulary average   Thought Process: goal directed and logical   Thought Content free of delusions, free of hallucinations and not internally preoccupied    Suicidal ideations death wishes   Homicidal ideations none   Mood:  depressed and sad   Affect:  mood-congruent   Memory recent  adequate   Memory remote:  adequate   Concentration:  adequate   Abstraction:  abstract   Insight:  age appropriate   Reliability good   Judgment:  age appropriate     ASSESSMENT:  The patient is a 45 y.o.  male with a history of chronic depression and anxiety with multiple chronic medical conditions. He states that he has chronic depression but his  suicidal ideations  cry for help and that he would never actually hurt himself due to his herminio. He states that his dialysis is exhausting and he is in constant pain and he is always being told that there is nothing that he can take or that can help him due to his kidney failure. He states that missing dialysis and then ends up in the hospital due to fluid overload and it is a revolving door.   He states that he missing dialysis due to the chronic pain in his knees and he cannot sit in the chairs at the dialysis center for 4 hours. He states that he has tried to get on the kidney transplant list but has continuously been declined. He states that he does not know the resolution because his depression is caused by chronic medical issues and those are not going to stop and he has to complete his dialysis so he does not see an end to the situation. MENTAL STATUS EXAM:  The patient is a middle age [de-identified] male who is dressed in hospital apparel. Taty Vargas is somewhat anxious and tearful during the interview. Corinne Sack is normal rate and rhythm, non-pressured.  Psychomotor activity is WNL.  Mood is reported as being very upset frustrated, depressed and sad and affect is congruent, patient is tearful. Shahnazbarronneptali Doing any perceptual abnormalities.  Denies homicidal ideation. Shahnazbarronneptali Doing suicidal ideation.  Denies any delusions.  His thought process is goal is goal oriented and logical.  Cognitively, he is awake and alert. Taty Vargas is cooperative with full cognitive assessment.  Insight is Good . Judgement is Good    PLAN  We will continue with the inpatient psychiatric treatment. While on the unit, patient will be involved in individual, group, milieu and occupational therapy. We will continue to obtain collateral information. Patient meets criteria for MDD, HERBERTH, Panic Disorder.        ESTIMATED LENGTH OF STAY:   5-7 days                       SIGNED:    Sun Augustin NP  2/20/2021

## 2021-02-21 LAB
GLUCOSE BLD STRIP.AUTO-MCNC: 107 MG/DL (ref 65–100)
GLUCOSE BLD STRIP.AUTO-MCNC: 149 MG/DL (ref 65–100)
GLUCOSE BLD STRIP.AUTO-MCNC: 177 MG/DL (ref 65–100)
GLUCOSE BLD STRIP.AUTO-MCNC: 198 MG/DL (ref 65–100)
SERVICE CMNT-IMP: ABNORMAL

## 2021-02-21 PROCEDURE — 74011250637 HC RX REV CODE- 250/637: Performed by: NURSE PRACTITIONER

## 2021-02-21 PROCEDURE — 82962 GLUCOSE BLOOD TEST: CPT

## 2021-02-21 PROCEDURE — 65220000003 HC RM SEMIPRIVATE PSYCH

## 2021-02-21 PROCEDURE — 74011636637 HC RX REV CODE- 636/637: Performed by: NURSE PRACTITIONER

## 2021-02-21 RX ORDER — ESCITALOPRAM OXALATE 10 MG/1
20 TABLET ORAL DAILY
Status: DISCONTINUED | OUTPATIENT
Start: 2021-02-21 | End: 2021-02-23 | Stop reason: HOSPADM

## 2021-02-21 RX ADMIN — Medication 2001 MG: at 12:32

## 2021-02-21 RX ADMIN — DICLOFENAC SODIUM 2 G: 10 GEL TOPICAL at 08:21

## 2021-02-21 RX ADMIN — TRAZODONE HYDROCHLORIDE 50 MG: 50 TABLET ORAL at 22:36

## 2021-02-21 RX ADMIN — DICLOFENAC SODIUM 2 G: 10 GEL TOPICAL at 17:01

## 2021-02-21 RX ADMIN — INSULIN HUMAN 30 UNITS: 100 INJECTION, SUSPENSION SUBCUTANEOUS at 08:16

## 2021-02-21 RX ADMIN — Medication 1 CAPSULE: at 08:17

## 2021-02-21 RX ADMIN — HYDROCORTISONE 5 MG: 10 TABLET ORAL at 17:01

## 2021-02-21 RX ADMIN — GABAPENTIN 200 MG: 100 CAPSULE ORAL at 08:18

## 2021-02-21 RX ADMIN — Medication 8.6 MG: at 08:19

## 2021-02-21 RX ADMIN — HYDROCORTISONE 10 MG: 10 TABLET ORAL at 08:19

## 2021-02-21 RX ADMIN — DICLOFENAC SODIUM 2 G: 10 GEL TOPICAL at 12:12

## 2021-02-21 RX ADMIN — ATORVASTATIN CALCIUM 80 MG: 40 TABLET, FILM COATED ORAL at 08:19

## 2021-02-21 RX ADMIN — TORSEMIDE 100 MG: 100 TABLET ORAL at 08:23

## 2021-02-21 RX ADMIN — METOPROLOL TARTRATE 12.5 MG: 25 TABLET, FILM COATED ORAL at 08:20

## 2021-02-21 RX ADMIN — ASPIRIN 81 MG: 81 TABLET, COATED ORAL at 08:19

## 2021-02-21 RX ADMIN — ESCITALOPRAM OXALATE 20 MG: 10 TABLET ORAL at 12:11

## 2021-02-21 RX ADMIN — GABAPENTIN 400 MG: 400 CAPSULE ORAL at 21:10

## 2021-02-21 RX ADMIN — Medication 1 CAPSULE: at 08:19

## 2021-02-21 RX ADMIN — METOPROLOL TARTRATE 12.5 MG: 25 TABLET, FILM COATED ORAL at 17:00

## 2021-02-21 RX ADMIN — Medication 2001 MG: at 17:00

## 2021-02-21 RX ADMIN — Medication 2001 MG: at 06:26

## 2021-02-21 RX ADMIN — HUMAN INSULIN 15 UNITS: 100 INJECTION, SUSPENSION SUBCUTANEOUS at 21:11

## 2021-02-21 NOTE — SUICIDE SAFETY PLAN
SAFETY PLAN    A suicide Safety Plan is a document that supports someone when they are having thoughts of suicide. Warning Signs that indicate a suicidal crisis may be developing: What (situations, thoughts, feelings, body sensations, behaviors, etc.) do you experience that lets you know you are beginning to think about suicide? 1. Dialysis  2. Nighttime  3. Loneliness    Internal Coping Strategies:  What things can I do (relaxation techniques, hobbies, physical activities, etc.) to take my mind off my problems without contacting another person? 1. Comply with treatment  2. Distract myself  3. Spend time with my wife    People and social settings that provide distraction: Who can I call or where can I go to distract me? 1. Name: Wife  Phone:   2. Name: Andry Fair  Phone:    3. Place: Shopping            4. Place: Traveling    People whom I can ask for help: Who can I call when I need help - for example, friends, family, clergy, someone else? 1. Name: Same as above                Phone:   2. Name:   Phone:   3. Name:   Phone:     Professionals or [address] [place] [address] I can contact during a crisis: Who can I call for help - for example, my doctor, my psychiatrist, my psychologist, a mental health provider, a suicide hotline? 1. Clinician Name:Duyen Shah NP with [facility]    Phone:       Clinician Pager or Emergency Contact #:     2. Clinician Name:    Phone:       Clinician Pager or Emergency Contact #:     3. Suicide Prevention Lifeline: 1-621-143-TALK (4718)    4. [address] [place] [address] Emergency Services -  for example, [facility] suicide hotline, [facility] Hotline: 211      Emergency Services Address: [address] [place] [address]      Emergency Services Phone:     Making the environment safe: How can I make my environment (house/apartment/living space) safer? For example, can I remove guns, medications, and other items? 1. Remove old meds  2.

## 2021-02-21 NOTE — PROGRESS NOTES
Pt appears to be asleep. Lying quietly in bed. NAD. Respirations WNL. Will continue to monitor. Problem: Falls - Risk of  Goal: *Absence of Falls  Description: Document Neetu Gomez Fall Risk and appropriate interventions in the flowsheet.   Outcome: Progressing Towards Goal  Note: Fall Risk Interventions:  Mobility Interventions: Utilize gait belt for transfers/ambulation         Medication Interventions: Teach patient to arise slowly          Sleep= 7

## 2021-02-21 NOTE — PROGRESS NOTES
Problem: Depressed Mood (Adult/Pediatric)  Goal: *STG: Participates in treatment plan  Outcome: Progressing Towards Goal  Affect is bright. Pt participates in all therapeutic activities.

## 2021-02-21 NOTE — PROGRESS NOTES
Problem: Depressed Mood (Adult/Pediatric)  Goal: *STG: Participates in treatment plan  Outcome: Progressing Towards Goal  Note: Patient is participatory in treatment team. States that he is starting to feel better than when he came in and has enjoyed meaning the \"different kinds of people\" here. The social interaction has benefited his mood and explains that the weather, dialysis timing, and lack of sociability affects how he feels and relationships. Confused about unit rules to which education was provided again. Patient is med and meal compliant. Still complains of how aggravated he is due to his knee pain during dialysis and how he can't receive medications for it. This nurse explained to him, that most pain medications are excreted renally and if his kidneys are not working properly, then that medication will build up in his system and cause further complications, or the pain medication would be filtered out by the dialysis process anyhow. Patient said he understood and has been told this before but continues to complain.    Goal: *STG: Verbalizes anger, guilt, and other feelings in a constructive manor  Outcome: Progressing Towards Goal  Goal: *STG: Remains safe in hospital  Outcome: Progressing Towards Goal  Goal: *STG: Complies with medication therapy  Outcome: Progressing Towards Goal  Goal: Interventions  Outcome: Progressing Towards Goal

## 2021-02-21 NOTE — PROGRESS NOTES
[facility] Adult  Hospitalist Group                                                                                          Hospitalist Progress Note  Brendon Cuadra NP  Answering service: [phone] from in house phone        Date of Service:  2021  NAME:  Rancho Clark  :  1970  MRN:  [id number]      Admission Summary:   Cassandra Wolf a 45 y. o. male with a PMH of Hx MI, Anemia of chronic disease, Major Depression, Anxiety, Osteoarthritis, Diastolic CHF, CAD, Neuropathy, ESRD on HD, HTN, HLD, Hypopituitarism /Adrenal Insufficiency, NORAH and T2DM who was transferred to [address] [place] [address] from [facility] for management of Major Depression w/ SI's. Patient was taken to ED for worsening feelings of depression x1 week 2 to physical complications from missing dialysis session (d/t weather). After complete work- up deemed stable for transfer to [place] [address]. .     The patient stated he feels he has progressive volume overload since missed dialysis, c/o SOB and Edema. He denied any other recent changes in his medication regimen or health. He stated he has regular f/u's with his PCP, cardiologist, and nephrologist.    Interval history / Subjective: Follow-up for issues listed below.   -Patient seen and examined, no c/o's. Assessment & Plan:     [facility] HD[facility] Dr. Cherry Willard  -Missed one episode hemodialysis about a week ago due to weather  -Dialyzed 2021  -Nephrology following  -labs w/ dialysis, difficult stick     HTN: hypotension with dialysis requiring midodrine and albumin  -continue home BB, hold for bradycardia/hypotension     CAD/HLD: denied CP, continue home aspirin, BB and statin. T2DM: ISS, basal, ACHS Accu-Cheks, hypoglycemia protocol. Hga1c: 11.  Diabetic Neuropathy: continue home gabapentin. Hypopituitarism/Adrenal insufficiency: continue home hydrocortisone, monitor BP. Secondary Hyperparathyroidism: continue home medications. TSH: pending. Anemia of Chronic Disease: no active bleeding, continue to monitor Hgb. NORAH: continue home CPAP. Osteoarthritis: continue home Voltaren gel, Tylenol as needed.     Major Depression/Anxiety/SI's: managed per Psych team.     DVTppx: up ad adan  GIppx: Pepcid  Code Status: Full Code  Diet: Diabetic   Activity: up ad adan  Discharge: TBD  Ambulates: walker     Hospital Problems  Never Reviewed          Codes Class Noted POA    * (Principal) Major depressive disorder ICD-10-CM: F32.9  ICD-9-CM: 296.20  2/19/2021 Unknown                Review of Systems:   A comprehensive review of systems was negative except for that written in the HPI. Vital Signs:    Last 24hrs VS reviewed since prior progress note. Most recent are:  Visit Vitals  BP (!) 153/77   Pulse 65   Temp 97.6 °F (36.4 °C)   Resp 16   Wt 129.3 kg (285 lb)   SpO2 98%       No intake or output data in the 24 hours ending 02/21/21 1243     Physical Examination:     I had a face to face encounter with this patient and independently examined them on 2/21/2021 as outlined below:    Constitutional:  No acute distress, cooperative, pleasant    ENT:  Oral mucosa moist.    Resp:  CTA bilaterally. No wheezing/rhonchi/rales. No accessory muscle use. CV:  Regular rhythm, normal rate, S1,S2.    GI/:  Soft, obese, non tender, no guarding, BS present. Voids Freely. Musculoskeletal:  No edema, warm, 2+ pulses throughout. Neurologic:  Moves all extremities, BLE generalized weakness. LUE dialysis site. AAOx3, CN II-XII reviewed. Skin:  Left 2nd digit ulcers, amputated left great toe. Psych:  Poorinsight, Not anxious nor agitated. Data Review:    Review and/or order of clinical lab test      Labs:   No results for input(s): WBC, HGB, HCT, PLT, HGBEXT, HCTEXT, PLTEXT in the last 72 hours. No results for input(s): NA, K, CL, CO2, BUN, CREA, GLU, CA, MG, PHOS, URICA in the last 72 hours.   No results for input(s): ALT, AP, TBIL, TBILI, TP, ALB, GLOB, GGT, AML, LPSE in the last 72 hours. No lab exists for component: SGOT, GPT, AMYP, HLPSE  No results for input(s): INR, PTP, APTT, INREXT in the last 72 hours. No results for input(s): FE, TIBC, PSAT, FERR in the last 72 hours. No results found for: FOL, RBCF   No results for input(s): PH, PCO2, PO2 in the last 72 hours. No results for input(s): CPK, CKNDX, TROIQ in the last 72 hours.     No lab exists for component: CPKMB  No results found for: CHOL, CHOLX, CHLST, CHOLV, HDL, HDLP, LDL, LDLC, DLDLP, TGLX, TRIGL, TRIGP, CHHD, CHHDX  Lab Results   Component Value Date/Time    Glucose (POC) 149 (H) 02/21/2021 11:36 AM    Glucose (POC) 107 (H) 02/21/2021 07:43 AM    Glucose (POC) 179 (H) 02/20/2021 07:26 PM    Glucose (POC) 137 (H) 02/20/2021 04:46 PM    Glucose (POC) 140 (H) 02/20/2021 11:47 AM     No results found for: COLOR, APPRN, SPGRU, REFSG, GABE, PROTU, GLUCU, KETU, BILU, UROU, SONJA, LEUKU, GLUKE, EPSU, BACTU, WBCU, RBCU, CASTS, UCRY      Medications Reviewed:     Current Facility-Administered Medications   Medication Dose Route Frequency    escitalopram oxalate (LEXAPRO) tablet 20 mg  20 mg Oral DAILY    OLANZapine (ZyPREXA) tablet 5 mg  5 mg Oral Q6H PRN    haloperidol lactate (HALDOL) injection 5 mg  5 mg IntraMUSCular Q6H PRN    benztropine (COGENTIN) tablet 1 mg  1 mg Oral BID PRN    diphenhydrAMINE (BENADRYL) injection 50 mg  50 mg IntraMUSCular BID PRN    hydrOXYzine HCL (ATARAX) tablet 50 mg  50 mg Oral TID PRN    LORazepam (ATIVAN) injection 1 mg  1 mg IntraMUSCular Q4H PRN    traZODone (DESYREL) tablet 50 mg  50 mg Oral QHS PRN    acetaminophen (TYLENOL) tablet 650 mg  650 mg Oral Q4H PRN    magnesium hydroxide (MILK OF MAGNESIA) 400 mg/5 mL oral suspension 30 mL  30 mL Oral DAILY PRN    midodrine (PROAMATINE) tablet 10 mg  10 mg Oral DIALYSIS MON, WED & FRI    albumin human 25% (BUMINATE) solution 12.5 g  12.5 g IntraVENous DIALYSIS PRN    aspirin delayed-release tablet 81 mg  81 mg Oral DAILY    atorvastatin (LIPITOR) tablet 80 mg  80 mg Oral DAILY    calcium acetate (phosphate binder) (PHOSLO) tablet 2,001 mg  2,001 mg Oral TIDAC    diclofenac (VOLTAREN) 1 % topical gel 2 g  2 g Topical QID    gabapentin (NEURONTIN) capsule 400 mg  400 mg Oral QHS    gabapentin (NEURONTIN) capsule 200 mg  200 mg Oral DAILY    hydrocortisone (CORTEF) tablet 5 mg  5 mg Oral QPM    hydrocortisone (CORTEF) tablet 10 mg  10 mg Oral DAILY    insulin NPH (NOVOLIN N, HUMULIN N) injection 15 Units  15 Units SubCUTAneous QHS    insulin NPH (NOVOLIN N, HUMULIN N) injection 30 Units  30 Units SubCUTAneous ACB    metoprolol tartrate (LOPRESSOR) tablet 12.5 mg  12.5 mg Oral BID    fish oil-omega-3 fatty acids 340-1,000 mg capsule 1 Cap  1 Cap Oral DAILY    senna (SENOKOT) tablet 8.6 mg  1 Tab Oral DAILY    torsemide (DEMADEX) tablet 100 mg  100 mg Oral DAILY    B complex-vitaminC-folic acid (NEPHROCAP) cap  1 Cap Oral DAILY    insulin lispro (HUMALOG) injection   SubCUTAneous AC&HS    glucose chewable tablet 16 g  4 Tab Oral PRN    dextrose (D50W) injection syrg 12.5-25 g  12.5-25 g IntraVENous PRN    glucagon (GLUCAGEN) injection 1 mg  1 mg IntraMUSCular PRN     ______________________________________________________________________  EXPECTED LENGTH OF STAY: - - -  ACTUAL LENGTH OF STAY:          2                 Farrah Walter NP

## 2021-02-21 NOTE — BH NOTES
GROUP THERAPY PROGRESS NOTE    Patient is participating in Discharge/Goals/Community Meeting Group. Group time: 50 minutes    Personal goal for participation: Process feelings related to discharge and/or feelings/goals for today. Goal orientation: Personal    Group therapy participation: active    Therapeutic interventions reviewed and discussed: Group discussion was focused on discharge plans and anxiety related to this. Group members discussed what they planned to do once discharge and discharge plans. Discussion also related to support and communication issues that arise. Group members verbalized how they are feeling today, their personal goal for today, and goals for the week. Patients were given an opportunity to share any concerns and issues they were having. Patients completed safety plan. Impression of participation: Vandy Merlin actively participated in group. MSW assisted pt filling out the safety plan because his hands hurt. He identified his wife and daughter being his biggest support. Patient was able to recognize that he needs to comply with his treatment to not feel ill. Cooperative in group. Depressed mood.      Mei Evans, Supervisee in Social Work

## 2021-02-21 NOTE — PROGRESS NOTES
Psychiatric Progress Note    Date: 2/21/2021  Account Number:  [de-identified]  Name: Wes Rm PROGRESS NOTE:  To include the following:   A coordinated, multidisplinary treatment team round was conducted with the patient, nurses, pharmcist,  and writer present. Discussions held with , and/or with family members; Complete current electronic health record for patient was reviewed in full including consultant notes, ancillary staff notes, nurses and tech notes, labs and vitals. SUBJECTIVE:   CC: \" I feel better than I did when I came in honestly \"    HPI/Interval History:   Nonda [place] reports the following psychiatric symptoms:  depression and suicidal thoughts/threats. The symptoms have been present for years and are of moderate / high severity. The symptoms occur constantly. Additional symptoms include agitation, anxiety, anxiety attacks, chronic pain, depression worse, difficulty sleeping, feeling depressed and feeling suicidal. Current symptoms are likely precipitated by chronic medical comorbidities. Review of Systems:  Appetite: Normal  Sleep: Normal  Pt reports feeling better  Pt denies GI symptoms such as N/V; Pt denies dizziness/HA    Side Effects:  None reported or admitted to. Past Medical History:   Active Ambulatory Problems     Diagnosis Date Noted    No Active Ambulatory Problems     Resolved Ambulatory Problems     Diagnosis Date Noted    No Resolved Ambulatory Problems     Past Medical History:   Diagnosis Date    Adrenal insufficiency (HCC)     Anemia     Anxiety     Arthritis     Atrial fibrillation (HCC)     Bell's palsy     CAD (coronary artery disease)     Depression     Diabetes mellitus (HCC)     Diabetic neuropathy (HCC)     Diastolic heart failure (HCC)     ESRD (end stage renal disease) (HCC)     HTN (hypertension)     Hyperlipidemia     [facility]op[facility]ism [facility] [address]     Morbid obesity (HCC)     Myocardial infarction ([facility] [address]     NORAH (obstructive sleep apnea)      Past medical history has been reviewed (see dictated report) with no additional updates (I asked patient and no additional past medical history provided). Social History:  Social history has been reviewed (see dictated report) with no additional updates (I asked patient and no additional social history provided). Family History:  Family history has been reviewed (see dictated report) with no additional updates (I asked patient and no additional family history provided).     OBJECTIVE:                 MENTAL STATUS EXAM:   FINDINGS WITHIN NORMAL LIMITS (WNL) UNLESS OTHERWISE STATED BELOW:    Orientation oriented to time, place and person   Vital Signs See below (reviewed)   Gait Within normal limits   Abnormal Muscular Movements/Tone/Behavior No EPS, no evidence of TD, within normal limits   Relations cooperative   General Appearance:  older than stated age   Speech/Language:  normal pitch, normal volume and non-pressured   Thought Process: logical and within normal limits   Thought Content free of delusions, free of hallucinations and not internally preoccupied    Suicidal Ideations death wishes   Homicidal Ideations none   Mood:  anxious and depressed   Affect:  mood-congruent   Memory recent  adequate   Memory remote:  adequate   Concentration/Attention:  adequate   Fund of Knowledge Fair/average   Insight:  age appropriate   Reliability good   Judgment:  age appropriate     Pertinent data:  Patient Vitals for the past 8 hrs:   BP Temp Pulse Resp SpO2 Weight   02/21/21 1001      129.3 kg (285 lb)   02/21/21 0823 129/81 97.8 °F (36.6 °C) 74 16 95 %      Recent Results (from the past 24 hour(s))   GLUCOSE, POC    Collection Time: 02/20/21 11:47 AM   Result Value Ref Range    Glucose (POC) 140 (H) 65 - 100 mg/dL    Performed by Britt Horta    GLUCOSE, POC    Collection Time: 02/20/21  4:46 PM   Result Value Ref Range    Glucose (POC) 137 (H) 65 - 100 mg/dL Performed by Stephen Pierre, POC    Collection Time: 02/20/21  7:26 PM   Result Value Ref Range    Glucose (POC) 179 (H) 65 - 100 mg/dL    Performed by Raissa Tamez    GLUCOSE, POC    Collection Time: 02/21/21  7:43 AM   Result Value Ref Range    Glucose (POC) 107 (H) 65 - 100 mg/dL    Performed by Katlin Abdul        Medications:  Current Facility-Administered Medications   Medication Dose Route Frequency    OLANZapine (ZyPREXA) tablet 5 mg  5 mg Oral Q6H PRN    haloperidol lactate (HALDOL) injection 5 mg  5 mg IntraMUSCular Q6H PRN    benztropine (COGENTIN) tablet 1 mg  1 mg Oral BID PRN    diphenhydrAMINE (BENADRYL) injection 50 mg  50 mg IntraMUSCular BID PRN    hydrOXYzine HCL (ATARAX) tablet 50 mg  50 mg Oral TID PRN    LORazepam (ATIVAN) injection 1 mg  1 mg IntraMUSCular Q4H PRN    traZODone (DESYREL) tablet 50 mg  50 mg Oral QHS PRN    acetaminophen (TYLENOL) tablet 650 mg  650 mg Oral Q4H PRN    magnesium hydroxide (MILK OF MAGNESIA) 400 mg/5 mL oral suspension 30 mL  30 mL Oral DAILY PRN    midodrine (PROAMATINE) tablet 10 mg  10 mg Oral DIALYSIS MON, WED & FRI    albumin human 25% (BUMINATE) solution 12.5 g  12.5 g IntraVENous DIALYSIS PRN    aspirin delayed-release tablet 81 mg  81 mg Oral DAILY    atorvastatin (LIPITOR) tablet 80 mg  80 mg Oral DAILY    calcium acetate (phosphate binder) (PHOSLO) tablet 2,001 mg  2,001 mg Oral TIDAC    diclofenac (VOLTAREN) 1 % topical gel 2 g  2 g Topical QID    gabapentin (NEURONTIN) capsule 400 mg  400 mg Oral QHS    gabapentin (NEURONTIN) capsule 200 mg  200 mg Oral DAILY    hydrocortisone (CORTEF) tablet 5 mg  5 mg Oral QPM    hydrocortisone (CORTEF) tablet 10 mg  10 mg Oral DAILY    insulin NPH (NOVOLIN N, HUMULIN N) injection 15 Units  15 Units SubCUTAneous QHS    insulin NPH (NOVOLIN N, HUMULIN N) injection 30 Units  30 Units SubCUTAneous ACB    metoprolol tartrate (LOPRESSOR) tablet 12.5 mg  12.5 mg Oral BID    fish oil-omega-3 fatty acids 340-1,000 mg capsule 1 Cap  1 Cap Oral DAILY    senna (SENOKOT) tablet 8.6 mg  1 Tab Oral DAILY    torsemide (DEMADEX) tablet 100 mg  100 mg Oral DAILY    B complex-vitaminC-folic acid (NEPHROCAP) cap  1 Cap Oral DAILY    insulin lispro (HUMALOG) injection   SubCUTAneous AC&HS    glucose chewable tablet 16 g  4 Tab Oral PRN    dextrose (D50W) injection syrg 12.5-25 g  12.5-25 g IntraVENous PRN    glucagon (GLUCAGEN) injection 1 mg  1 mg IntraMUSCular PRN       Allergies:  No Known Allergies    Scheduled Medications:  Current Facility-Administered Medications   Medication Dose Route Frequency    midodrine (PROAMATINE) tablet 10 mg  10 mg Oral DIALYSIS MON, WED & FRI    aspirin delayed-release tablet 81 mg  81 mg Oral DAILY    atorvastatin (LIPITOR) tablet 80 mg  80 mg Oral DAILY    calcium acetate (phosphate binder) (PHOSLO) tablet 2,001 mg  2,001 mg Oral TIDAC    diclofenac (VOLTAREN) 1 % topical gel 2 g  2 g Topical QID    gabapentin (NEURONTIN) capsule 400 mg  400 mg Oral QHS    gabapentin (NEURONTIN) capsule 200 mg  200 mg Oral DAILY    hydrocortisone (CORTEF) tablet 5 mg  5 mg Oral QPM    hydrocortisone (CORTEF) tablet 10 mg  10 mg Oral DAILY    insulin NPH (NOVOLIN N, HUMULIN N) injection 15 Units  15 Units SubCUTAneous QHS    insulin NPH (NOVOLIN N, HUMULIN N) injection 30 Units  30 Units SubCUTAneous ACB    metoprolol tartrate (LOPRESSOR) tablet 12.5 mg  12.5 mg Oral BID    fish oil-omega-3 fatty acids 340-1,000 mg capsule 1 Cap  1 Cap Oral DAILY    senna (SENOKOT) tablet 8.6 mg  1 Tab Oral DAILY    torsemide (DEMADEX) tablet 100 mg  100 mg Oral DAILY    B complex-vitaminC-folic acid (NEPHROCAP) cap  1 Cap Oral DAILY    insulin lispro (HUMALOG) injection   SubCUTAneous AC&HS         ASSESSMENT/PLAN:   Patient is still with a severe exacerbation of condition which is worsening, not improving/stable  Diagnoses:  Patient Active Hospital Problem List:   Major depressive disorder (2/19/2021)    Assessment: The patient reports that he is feeling better today than he did yesterday. He states that he slept better last night than he has in the past two weeks. He reports that he has been opening up to people and feeling like he is gaining something out of talking about how he feels. He states that he is gaining something from being here and feeling that it is helping him to see that there are other people that are experiencing the same thing that he is. He reports that when he is at home he has no one to talk to because his wife is gone and he is sitting at home alone. Plan: continue current treatment plan, monitor for safety          The following regarding medications was addressed during rounds with patient:   the risks and benefits of the proposed medication. The patient was given the opportunity to ask questions. Informed consent given to the use of the above medications. Will continue to adjust psychiatric and non-psychiatric medications   (see above \"medication\" section for details) as deemed appropriate &  based upon diagnoses and response to treatment. Will continue to order blood tests/labs and diagnostic tests as deemed appropriate and review results as they become available. Will continue to provide individual, milieu, occupational, group, and   substance abuse therapies to address target symptoms as deemed   appropriate for the individual patient.     EXPECTED DISCHARGE DATE (Day): TBD    DISPOSITION: Home    Signed By: Rekha Cruz NP

## 2021-02-22 LAB
ALBUMIN SERPL-MCNC: 3.4 G/DL (ref 3.5–5)
ANION GAP SERPL CALC-SCNC: 10 MMOL/L (ref 5–15)
BASOPHILS # BLD: 0.1 K/UL (ref 0–0.1)
BASOPHILS NFR BLD: 1 % (ref 0–1)
BUN SERPL-MCNC: 55 MG/DL (ref 6–20)
BUN/CREAT SERPL: 7 (ref 12–20)
CALCIUM SERPL-MCNC: 8.1 MG/DL (ref 8.5–10.1)
CHLORIDE SERPL-SCNC: 100 MMOL/L (ref 97–108)
CHOLEST SERPL-MCNC: 119 MG/DL
CO2 SERPL-SCNC: 27 MMOL/L (ref 21–32)
CREAT SERPL-MCNC: 7.46 MG/DL (ref 0.7–1.3)
DIFFERENTIAL METHOD BLD: ABNORMAL
EOSINOPHIL # BLD: 0.2 K/UL (ref 0–0.4)
EOSINOPHIL NFR BLD: 4 % (ref 0–7)
ERYTHROCYTE [DISTWIDTH] IN BLOOD BY AUTOMATED COUNT: 13.7 % (ref 11.5–14.5)
GLUCOSE BLD STRIP.AUTO-MCNC: 121 MG/DL (ref 65–100)
GLUCOSE BLD STRIP.AUTO-MCNC: 133 MG/DL (ref 65–100)
GLUCOSE BLD STRIP.AUTO-MCNC: 142 MG/DL (ref 65–100)
GLUCOSE P FAST SERPL-MCNC: 136 MG/DL (ref 65–100)
GLUCOSE SERPL-MCNC: 129 MG/DL (ref 65–100)
HBV SURFACE AB SER QL: REACTIVE
HBV SURFACE AB SER-ACNC: 13.47 MIU/ML
HBV SURFACE AG SER QL: <0.1 INDEX
HBV SURFACE AG SER QL: NEGATIVE
HCT VFR BLD AUTO: 29 % (ref 36.6–50.3)
HDLC SERPL-MCNC: 25 MG/DL
HDLC SERPL: 4.8 {RATIO} (ref 0–5)
HGB BLD-MCNC: 9.8 G/DL (ref 12.1–17)
IMM GRANULOCYTES # BLD AUTO: 0 K/UL (ref 0–0.04)
IMM GRANULOCYTES NFR BLD AUTO: 1 % (ref 0–0.5)
LDLC SERPL CALC-MCNC: 25.8 MG/DL (ref 0–100)
LIPID PROFILE,FLP: ABNORMAL
LYMPHOCYTES # BLD: 1.4 K/UL (ref 0.8–3.5)
LYMPHOCYTES NFR BLD: 23 % (ref 12–49)
MCH RBC QN AUTO: 33.8 PG (ref 26–34)
MCHC RBC AUTO-ENTMCNC: 33.8 G/DL (ref 30–36.5)
MCV RBC AUTO: 100 FL (ref 80–99)
MONOCYTES # BLD: 0.6 K/UL (ref 0–1)
MONOCYTES NFR BLD: 11 % (ref 5–13)
NEUTS SEG # BLD: 3.6 K/UL (ref 1.8–8)
NEUTS SEG NFR BLD: 60 % (ref 32–75)
NRBC # BLD: 0 K/UL (ref 0–0.01)
NRBC BLD-RTO: 0 PER 100 WBC
PHOSPHATE SERPL-MCNC: 5.5 MG/DL (ref 2.6–4.7)
PLATELET # BLD AUTO: 178 K/UL (ref 150–400)
PMV BLD AUTO: 10.4 FL (ref 8.9–12.9)
POTASSIUM SERPL-SCNC: 4.3 MMOL/L (ref 3.5–5.1)
RBC # BLD AUTO: 2.9 M/UL (ref 4.1–5.7)
SERVICE CMNT-IMP: ABNORMAL
SODIUM SERPL-SCNC: 137 MMOL/L (ref 136–145)
TRIGL SERPL-MCNC: 341 MG/DL (ref ?–150)
TSH SERPL DL<=0.05 MIU/L-ACNC: 1.68 UIU/ML (ref 0.36–3.74)
VLDLC SERPL CALC-MCNC: 68.2 MG/DL
WBC # BLD AUTO: 6 K/UL (ref 4.1–11.1)

## 2021-02-22 PROCEDURE — 90935 HEMODIALYSIS ONE EVALUATION: CPT

## 2021-02-22 PROCEDURE — 74011250637 HC RX REV CODE- 250/637: Performed by: NURSE PRACTITIONER

## 2021-02-22 PROCEDURE — 86706 HEP B SURFACE ANTIBODY: CPT

## 2021-02-22 PROCEDURE — 80069 RENAL FUNCTION PANEL: CPT

## 2021-02-22 PROCEDURE — 74011636637 HC RX REV CODE- 636/637: Performed by: NURSE PRACTITIONER

## 2021-02-22 PROCEDURE — 82947 ASSAY GLUCOSE BLOOD QUANT: CPT

## 2021-02-22 PROCEDURE — 87340 HEPATITIS B SURFACE AG IA: CPT

## 2021-02-22 PROCEDURE — P9047 ALBUMIN (HUMAN), 25%, 50ML: HCPCS | Performed by: INTERNAL MEDICINE

## 2021-02-22 PROCEDURE — 74011250637 HC RX REV CODE- 250/637: Performed by: PSYCHIATRY & NEUROLOGY

## 2021-02-22 PROCEDURE — 80061 LIPID PANEL: CPT

## 2021-02-22 PROCEDURE — 36415 COLL VENOUS BLD VENIPUNCTURE: CPT

## 2021-02-22 PROCEDURE — 85025 COMPLETE CBC W/AUTO DIFF WBC: CPT

## 2021-02-22 PROCEDURE — 82962 GLUCOSE BLOOD TEST: CPT

## 2021-02-22 PROCEDURE — 74011250636 HC RX REV CODE- 250/636: Performed by: INTERNAL MEDICINE

## 2021-02-22 PROCEDURE — 84443 ASSAY THYROID STIM HORMONE: CPT

## 2021-02-22 PROCEDURE — 65220000003 HC RM SEMIPRIVATE PSYCH

## 2021-02-22 RX ORDER — RISPERIDONE 0.25 MG/1
0.25 TABLET, FILM COATED ORAL EVERY MORNING
COMMUNITY
End: 2021-02-23

## 2021-02-22 RX ORDER — TRAMADOL HYDROCHLORIDE 50 MG/1
50 TABLET ORAL
Status: DISCONTINUED | OUTPATIENT
Start: 2021-02-22 | End: 2021-02-23 | Stop reason: HOSPADM

## 2021-02-22 RX ORDER — ALPRAZOLAM 0.25 MG/1
0.25 TABLET ORAL
COMMUNITY
End: 2021-02-23

## 2021-02-22 RX ORDER — RISPERIDONE 0.5 MG/1
0.25 TABLET, FILM COATED ORAL EVERY EVENING
COMMUNITY
End: 2021-02-23

## 2021-02-22 RX ADMIN — INSULIN HUMAN 30 UNITS: 100 INJECTION, SUSPENSION SUBCUTANEOUS at 08:13

## 2021-02-22 RX ADMIN — ALBUMIN (HUMAN) 12.5 G: 0.25 INJECTION, SOLUTION INTRAVENOUS at 17:21

## 2021-02-22 RX ADMIN — GABAPENTIN 400 MG: 400 CAPSULE ORAL at 21:22

## 2021-02-22 RX ADMIN — MIDODRINE HYDROCHLORIDE 10 MG: 5 TABLET ORAL at 21:29

## 2021-02-22 RX ADMIN — Medication 2001 MG: at 06:37

## 2021-02-22 RX ADMIN — TRAZODONE HYDROCHLORIDE 50 MG: 50 TABLET ORAL at 21:32

## 2021-02-22 RX ADMIN — TRAMADOL HYDROCHLORIDE 50 MG: 50 TABLET, FILM COATED ORAL at 16:45

## 2021-02-22 RX ADMIN — METOPROLOL TARTRATE 12.5 MG: 25 TABLET, FILM COATED ORAL at 21:23

## 2021-02-22 RX ADMIN — HYDROCORTISONE 5 MG: 10 TABLET ORAL at 21:23

## 2021-02-22 RX ADMIN — DICLOFENAC SODIUM 2 G: 10 GEL TOPICAL at 21:21

## 2021-02-22 RX ADMIN — HUMAN INSULIN 15 UNITS: 100 INJECTION, SUSPENSION SUBCUTANEOUS at 21:20

## 2021-02-22 NOTE — BH NOTES
GROUP THERAPY PROGRESS NOTE    Patient is participating in Mindfulness group. Group time: 45 minutes    Personal goal for participation: To try new coping skills    Goal orientation: Personal    Group therapy participation: active    Therapeutic interventions reviewed and discussed: Group participating in a progressive muscle relaxation activity designed to reduce physical stress and anxiety. Group members were asked to try to relax and follow along with the CD. Discussion of how each person felt during and after exercise. Group members were able to discuss other coping exercises that work for them to reduce anxiety. Impression of participation: Pt presented with depressed mood, clear speech, logical thought process. Pt stated he has participated in meditation before to decrease anxiety. Pt actively participated in [employer]. Pt stated it significantly decreased his anxiety but was challenging to do with arthritis. Pt processed additional self-relaxation techniques to combat anxiety including listening to music, visualizing a safe space, and guided imagery meditation.     [facility]

## 2021-02-22 NOTE — BH NOTES
GROUP THERAPY PROGRESS NOTE    Patient is participating in Coping Skills-Depression Group. Group time: 50 minutes    Personal goal for participation: reduces symptom of depression and improve well-being     Goal orientation: Personal    Group therapy participation: active    Therapeutic interventions reviewed and discussed: Group members were handed a worksheet that discussed four research-supported techniques to alleviate symptoms of depression. These techniques include behavioral activation, using social support, positive journaling, and practicing mindfulness. Each member was encouraged to try each activity in group and process how it can apply outside of group. Impression of participation: Luis Felipe GroveVentura actively participated in group. Patient shared his biggest positive experience is meeting people on the [facility] and sharing similar experiences. He reports feeling better. Cooperative in group.      Mei Evans, Supervisee in Social Work

## 2021-02-22 NOTE — PROGRESS NOTES
Nephrology Progress Note  Cole Guevara  Date of Admission : 2/19/2021    CC: Follow up for ESRD       Assessment and Plan     ESRD-HD:  - Dialyzes MWF @ [facility] dialysis. F/b Dr Anna Perdomo   - HD today  - labs on dialysis     Anemia in CKD   - Labs pending   - will start BRITTANY if needed     [facility]   - continue [facility] meds      HTN :  - continue Home meds      BiPolar disorder  - per psych team      Type II DM        Interval History:  Seen and examined. No complaints. Was asking for something for pain when he dialyzes. No cp, sob, n/v/d. Current Medications: all current  Medications have been eviewed in EPIC  Review of Systems: Pertinent items are noted in HPI. Objective:  Vitals:    Vitals:    02/21/21 1445 02/21/21 1620 02/21/21 1956 02/22/21 0810   BP:  (!) 162/77 (!) 163/57 109/70   Pulse:  75 74 65   Resp:  16 16 18   Temp:  97.9 °F (36.6 °C) 98.1 °F (36.7 °C) 97.2 °F (36.2 °C)   SpO2:  98% 97% 97%   Weight:       Height: 5' 10\" (1.778 m)        Intake and Output:  No intake/output data recorded. No intake/output data recorded. Physical Examination:  General: NAD,Conversant   Neck:  Supple, no mass  Resp:  Lungs CTA B/L, no wheezing , normal respiratory effort  CV:  RRR,  no murmur or rub, 2+ LE edema  GI:  Soft, NT, + Bowel sounds, no hepatosplenomegaly  Neurologic:  Non focal  Psych:             AAO x 3 appropriate affect   Skin:  No Rash  Access:           RUE AVF +thrill/bruit    []    High complexity decision making was performed  []    Patient is at high-risk of decompensation with multiple organ involvement    Lab Data Personally Reviewed: I have reviewed all the pertinent labs, microbiology data and radiology studies during assessment. No results for input(s): NA, K, CL, CO2, GLU, BUN, CREA, CA, MG, PHOS, ALB, TBIL, ALT, INR, INREXT in the last 72 hours.     No lab exists for component: SGOT  No results for input(s): WBC, HGB, HCT, PLT, HGBEXT, HCTEXT, PLTEXT in the last 72 hours. No results found for: SDES  Lab Results   Component Value Date/Time    Culture result: MRSA NOT PRESENT 02/19/2021 09:48 PM    Culture result:  02/19/2021 09:48 PM     Screening of patient nares for MRSA is for surveillance purposes and, if positive, to facilitate isolation considerations in high risk settings. It is not intended for automatic decolonization interventions per se as regimens are not sufficiently effective to warrant routine use. Recent Results (from the past 24 hour(s))   GLUCOSE, POC    Collection Time: 02/21/21 11:36 AM   Result Value Ref Range    Glucose (POC) 149 (H) 65 - 100 mg/dL    Performed by [id number] [place] [address], POC    Collection Time: 02/21/21  4:04 PM   Result Value Ref Range    Glucose (POC) 177 (H) 65 - 100 mg/dL    Performed by [address] [place] [address], POC    Collection Time: 02/21/21  7:41 PM   Result Value Ref Range    Glucose (POC) 198 (H) 65 - 100 mg/dL    Performed by [address] [place] [address], POC    Collection Time: 02/22/21  7:40 AM   Result Value Ref Range    Glucose (POC) 121 (H) 65 - 100 mg/dL    Performed by Glennon Dancer (PCT)              Dre Phelan MD  [address]  Phone - [phone]   Fax - [phone]  [email or web address]

## 2021-02-22 NOTE — PROGRESS NOTES
Problem: Depressed Mood (Adult/Pediatric)  Goal: *STG: Participates in treatment plan  Outcome: Progressing Towards Goal  Note: Patient is participatory in treatment team. States that he feels \"fine and is doing good. I had no panic attacks yesterday and I have slept good ever since I've been here\". Nephrologist suggested patient receive tramadol for knee pain during dialysis since this seems to be contributing to his depression. Patient will be discharged tomorrow.    Goal: *STG: Verbalizes anger, guilt, and other feelings in a constructive manor  Outcome: Progressing Towards Goal  Goal: *STG: Attends activities and groups  Outcome: Progressing Towards Goal  Goal: *STG: Remains safe in hospital  Outcome: Progressing Towards Goal  Goal: *STG: Complies with medication therapy  Outcome: Progressing Towards Goal  Goal: Interventions  Outcome: Progressing Towards Goal

## 2021-02-22 NOTE — PROGRESS NOTES
Problem: Depressed Mood (Adult/Pediatric)  Goal: *STG: Participates in treatment plan  Outcome: Progressing Towards Goal  Pt socializes with peers and complies with meds.     2236 Trazadone 50 mg given per pt request to promote rest.

## 2021-02-22 NOTE — PROGRESS NOTES
[facility] Adult  Hospitalist Group                                                                                          Hospitalist Progress Note  Yusef Grady NP  Answering service: [phone] from in house phone        Date of Service:  2021  NAME:  Cole SHAH:  1970  MRN:  [id number]      Admission Summary:   Dennise Richard a 45 y. o. male with a PMH of Hx MI, Anemia of chronic disease, Major Depression, Anxiety, Osteoarthritis, Diastolic CHF, CAD, Neuropathy, ESRD on HD, HTN, HLD, Hypopituitarism /Adrenal Insufficiency, NORAH and T2DM who was transferred to [address] [place] [address] for management of Major Depression w/ SI's. Patient was taken to ED for worsening feelings of depression x1 week  to physical complications from missing dialysis session (d/t weather). After complete work- up deemed stable for transfer [address] [place] [address] The patient stated he feels he has progressive volume overload since missed dialysis, c/o SOB and Edema. He denied any other recent changes in his medication regimen or health. He stated he has regular f/u's with his PCP, cardiologist, and nephrologist.    Interval history / Subjective: Follow-up for issues listed below.   -Patient seen and examined, no c/o's. No overnight medical events. Assessment & Plan:     ESRD on HD: Dr. Anna Grijalva  -Missed one episode hemodialysis about a week ago due to weather  -Dialyzed 2021  -Nephrology following  -labs w/ dialysis, difficult stick     HTN: hypotension with dialysis requiring midodrine and albumin  -continue home BB, hold for bradycardia/hypotension     CAD/HLD: denied CP, continue home aspirin, BB and statin. T2DM: ISS, basal, ACHS Accu-Cheks, hypoglycemia protocol. Hga1c: 11.  Diabetic Neuropathy: continue home gabapentin.   Hypopituitarism/Adrenal insufficiency: continue home hydrocortisone, monitor BP.  Secondary Hyperparathyroidism: continue home medications. TSH: pending. Anemia of Chronic Disease: no active bleeding, continue to monitor Hgb. NORAH: continue home CPAP. Osteoarthritis: continue home Voltaren gel, Tylenol as needed.     Major Depression/Anxiety/SI's: managed per Psych team.     DVTppx: up ad adan  GIppx: Pepcid  Code Status: Full Code  Diet: Diabetic   Activity: up ad adan  Discharge: TBD by primary care team  [address] [facility] [facility] [address] Problems  Never Reviewed          Codes Class Noted POA    * (Principal) Major depressive disorder ICD-10-CM: F32.9  ICD-9-CM: 296.20  2/19/2021 Unknown                Review of Systems:   A comprehensive review of systems was negative except for that written in the HPI. Vital Signs:    Last 24hrs VS reviewed since prior progress note. Most recent are:  Visit Vitals  /70   Pulse 65   Temp 97.2 °F (36.2 °C)   Resp 18   Ht 5' 10\" (1.778 m)   Wt 129.3 kg (285 lb)   SpO2 97%   BMI 40.89 kg/m²       No intake or output data in the 24 hours ending 02/22/21 1158     Physical Examination:     I had a face to face encounter with this patient and independently examined them on 2/22/2021 as outlined below:          Constitutional:  No acute distress, cooperative, pleasant    ENT:  Oral mucosa moist.    Resp:  CTA bilaterally. No wheezing/rhonchi/rales. No accessory muscle use. CV:  Regular rhythm, normal rate, S1,S2.    GI/:  Soft, obese, non tender, no guarding, BS present. Voids Freely. Musculoskeletal:  No edema, warm, 2+ pulses throughout. Neurologic:  Moves all extremities. AAOx3, CN II-XII reviewed. Skin:  Left 2nd digit ulcers, amputated left great toe. Psych:  Poor insight, Not anxious nor agitated. Data Review:    Review and/or order of clinical lab test      Labs:   No results for input(s): WBC, HGB, HCT, PLT, HGBEXT, HCTEXT, PLTEXT in the last 72 hours.   No results for input(s): NA, K, CL, CO2, BUN, CREA, GLU, CA, MG, PHOS, URICA in the last 72 hours. No results for input(s): ALT, AP, TBIL, TBILI, TP, ALB, GLOB, GGT, AML, LPSE in the last 72 hours. No lab exists for component: SGOT, GPT, AMYP, HLPSE  No results for input(s): INR, PTP, APTT, INREXT in the last 72 hours. No results for input(s): FE, TIBC, PSAT, FERR in the last 72 hours. No results found for: FOL, RBCF   No results for input(s): PH, PCO2, PO2 in the last 72 hours. No results for input(s): CPK, CKNDX, TROIQ in the last 72 hours.     No lab exists for component: CPKMB  No results found for: CHOL, CHOLX, CHLST, CHOLV, HDL, HDLP, LDL, LDLC, DLDLP, TGLX, TRIGL, TRIGP, CHHD, CHHDX  Lab Results   Component Value Date/Time    Glucose (POC) 133 (H) 02/22/2021 10:58 AM    Glucose (POC) 121 (H) 02/22/2021 07:40 AM    Glucose (POC) 198 (H) 02/21/2021 07:41 PM    Glucose (POC) 177 (H) 02/21/2021 04:04 PM    Glucose (POC) 149 (H) 02/21/2021 11:36 AM     No results found for: COLOR, APPRN, SPGRU, REFSG, GABE, PROTU, GLUCU, KETU, BILU, UROU, SONJA, LEUKU, GLUKE, EPSU, BACTU, WBCU, RBCU, CASTS, UCRY      Medications Reviewed:     Current Facility-Administered Medications   Medication Dose Route Frequency    traMADoL (ULTRAM) tablet 50 mg  50 mg Oral BID PRN    escitalopram oxalate (LEXAPRO) tablet 20 mg  20 mg Oral DAILY    OLANZapine (ZyPREXA) tablet 5 mg  5 mg Oral Q6H PRN    haloperidol lactate (HALDOL) injection 5 mg  5 mg IntraMUSCular Q6H PRN    benztropine (COGENTIN) tablet 1 mg  1 mg Oral BID PRN    diphenhydrAMINE (BENADRYL) injection 50 mg  50 mg IntraMUSCular BID PRN    hydrOXYzine HCL (ATARAX) tablet 50 mg  50 mg Oral TID PRN    LORazepam (ATIVAN) injection 1 mg  1 mg IntraMUSCular Q4H PRN    traZODone (DESYREL) tablet 50 mg  50 mg Oral QHS PRN    acetaminophen (TYLENOL) tablet 650 mg  650 mg Oral Q4H PRN    magnesium hydroxide (MILK OF MAGNESIA) 400 mg/5 mL oral suspension 30 mL  30 mL Oral DAILY PRN    midodrine (PROAMATINE) tablet 10 mg  10 mg Oral DIALYSIS MON, WED & FRI   • albumin human 25% (BUMINATE) solution 12.5 g  12.5 g IntraVENous DIALYSIS PRN   • aspirin delayed-release tablet 81 mg  81 mg Oral DAILY   • atorvastatin (LIPITOR) tablet 80 mg  80 mg Oral DAILY   • calcium acetate (phosphate binder) (PHOSLO) tablet 2,001 mg  2,001 mg Oral TIDAC   • diclofenac (VOLTAREN) 1 % topical gel 2 g  2 g Topical QID   • gabapentin (NEURONTIN) capsule 400 mg  400 mg Oral QHS   • gabapentin (NEURONTIN) capsule 200 mg  200 mg Oral DAILY   • hydrocortisone (CORTEF) tablet 5 mg  5 mg Oral QPM   • hydrocortisone (CORTEF) tablet 10 mg  10 mg Oral DAILY   • insulin NPH (NOVOLIN N, HUMULIN N) injection 15 Units  15 Units SubCUTAneous QHS   • insulin NPH (NOVOLIN N, HUMULIN N) injection 30 Units  30 Units SubCUTAneous ACB   • metoprolol tartrate (LOPRESSOR) tablet 12.5 mg  12.5 mg Oral BID   • fish oil-omega-3 fatty acids 340-1,000 mg capsule 1 Cap  1 Cap Oral DAILY   • senna (SENOKOT) tablet 8.6 mg  1 Tab Oral DAILY   • torsemide (DEMADEX) tablet 100 mg  100 mg Oral DAILY   • B complex-vitaminC-folic acid (NEPHROCAP) cap  1 Cap Oral DAILY   • insulin lispro (HUMALOG) injection   SubCUTAneous AC&HS   • glucose chewable tablet 16 g  4 Tab Oral PRN   • dextrose (D50W) injection syrg 12.5-25 g  12.5-25 g IntraVENous PRN   • glucagon (GLUCAGEN) injection 1 mg  1 mg IntraMUSCular PRN     ______________________________________________________________________  EXPECTED LENGTH OF STAY: - - -  ACTUAL LENGTH OF STAY:          3                 Farrah Walter NP

## 2021-02-22 NOTE — PROGRESS NOTES
Admission Medication Reconciliation:    Information obtained from:  patient, patient's wife, [facility]  RxQuery data available¹:  YES    Comments/Recommendations: Updated PTA meds/reviewed patient's allergies. 1)  The patient reports that his PTA hydrocortisone is for adrenal replacement following pituitary gland removal. He also states that he uses his midodrine as needed for low blood pressure with dialysis. I was able to speak with the patient's wife, who fills his pill box. She clarified that the patient's PTA escitalopram has been replaced with buspirone by his PCP and that the PCP discontinued his hydroxyzine due to lack of efficacy. Additionally, the last time he used his topical diclofenac gel and zolpidem was a week ago. He has not used his testosterone gel in at least a month. 2)  Medication changes (since last review): Added  - risperidone 0.25 mg in morning and 0.5 mg in evening   - alprazolam 0.25 mg BID PRN for anxiety  - buspirone 7.5 mg BID    Adjusted  - gabapentin 100 mg in morning, 100 mg in evening, 300 mg at bedtime  - calcium acetate 667 mg TID    Removed  - escitalopram 20 mg daily  - hydroxyzine pamoate 25 mg 4x day PRN anxiety  - promethazine 25 mg q4h PRN nausea  - senna 8.6 mg daily       ¹RxQuery pharmacy benefit data reflects medications filled and processed through the patient's insurance, however   this data does NOT capture whether the medication was picked up or is currently being taken by the patient. Allergies:  Patient has no known allergies.     Significant PMH/Disease States:   Past Medical History:   Diagnosis Date    Adrenal insufficiency (HCC)     Anemia     Anxiety     Arthritis     Atrial fibrillation (HCC)     Bell's palsy     CAD (coronary artery disease)     Depression     Diabetes mellitus (HCC)     Diabetic neuropathy (HCC)     Diastolic heart failure (HCC)     ESRD (end stage renal disease) (HCC)     HTN (hypertension)     Hyperlipidemia [facility]op[facility] ([facility]     Morbid obesity ([facility]     [facility]     NORAH (obstructive sleep apnea)      Chief Complaint for this Admission:  No chief complaint on file. Prior to Admission Medications:   Prior to Admission Medications   Prescriptions Last Dose Informant Taking? ALPRAZolam (XANAX) 0.25 mg tablet   Yes   Sig: Take 0.25 mg by mouth two (2) times daily as needed for Anxiety. Omega-3 Fatty Acids 60- mg cpDR   Yes   Sig: Take 1 Tab by mouth daily. acetaminophen (TYLENOL) 500 mg tablet   Yes   Sig: Take 1,000 mg by mouth two (2) times daily as needed for Pain. aspirin delayed-release 81 mg tablet   Yes   Sig: Take 81 mg by mouth daily. atorvastatin (Lipitor) 80 mg tablet   Yes   Sig: Take 80 mg by mouth daily. calcium acetate (Calphron) 667 mg tab tablet   Yes   Sig: Take 667 mg by mouth three (3) times daily (with meals). diclofenac (Voltaren) 1 % gel   No   Sig: Apply 2 g to affected area four (4) times daily. gabapentin (NEURONTIN) 100 mg capsule   Yes   Sig: Take 100 mg by mouth two (2) times a day.   gabapentin (NEURONTIN) 300 mg capsule   Yes   Sig: Take 300 mg by mouth nightly. hydrocortisone (CORTEF) 10 mg tablet   Yes   Sig: Take 5 mg by mouth every evening. hydrocortisone (CORTEF) 10 mg tablet   Yes   Sig: Take 10 mg by mouth daily. insulin NPH (HumuLIN N NPH U-100 Insulin) 100 unit/mL injection   Yes   Sig: 15 Units by SubCUTAneous route nightly. insulin NPH (HumuLIN N NPH U-100 Insulin) 100 unit/mL injection   Yes   Si Units by SubCUTAneous route daily. metoprolol tartrate (LOPRESSOR) 25 mg tablet   Yes   Sig: Take 12.5 mg by mouth two (2) times a day. midodrine (PROAMATINE) 10 mg tablet   Yes   Sig: Take  by mouth three (3) times daily as needed. risperiDONE (RisperDAL) 0.25 mg tablet   Yes   Sig: Take 0.25 mg by mouth Every morning. risperiDONE (RisperDAL) 0.5 mg tablet   Yes   Sig: Take 0.25 mg by mouth every evening.  Take two 0.25 mg tablets every evening. testosterone (ANDROGEL) 1 % (25 mg/2.5gram) glpk   No   Si.5 g by TransDERmal route daily. torsemide (DEMADEX) 100 mg tablet   Yes   Sig: Take 100 mg by mouth daily. vit B,C-FA-zinc-selen-vit D3-E (RenaPlex-D) 800 mcg-12.5 mg -2,000 unit tab   Yes   Sig: Take 1 Tab by mouth daily. zolpidem (Ambien) 5 mg tablet   No   Sig: Take  by mouth nightly as needed for Sleep. Facility-Administered Medications: None     Please contact the main inpatient pharmacy with any questions or concerns at [phone] and we will direct you to the clinical pharmacist covering this patient's care while in-house.    Desmond Arambula

## 2021-02-22 NOTE — PROGRESS NOTES
Pt appears to be sleeping. No distress noted. Respirations even and unlabored. Will continue to monitor. Problem: Falls - Risk of  Goal: *Absence of Falls  Description: Document Clayton Evelin Fall Risk and appropriate interventions in the flowsheet.   Outcome: Progressing Towards Goal  Note: Fall Risk Interventions:  Mobility Interventions: Utilize walker, cane, or other assistive device         Medication Interventions: Teach patient to arise slowly

## 2021-02-22 NOTE — PROGRESS NOTES
Psychiatric Progress Note    Date: 2/22/2021  Account Number:  [de-identified]  Name: Myrna Wilks PROGRESS NOTE:  To include the following:   A coordinated, multidisplinary treatment team round was conducted with the patient, nurses, pharmcist,  and writer present. Discussions held with , and/or with family members; Complete current electronic health record for patient was reviewed in full including consultant notes, ancillary staff notes, nurses and tech notes, labs and vitals. SUBJECTIVE:   CC:   \"I feel better today\"    HPI/Interval History:   Leticia Danielson reports feeling better today. Says his mood is slowly improving. Denies any adverse events from his medications and has remained compliant with taking them. Feels he is physically improved, sleeping well and his appetite has improved. He is interactive in the milieu and social with peers. Nephrology input is much appreciated and they recommended starting Tramadol for pain PRN. Review of Systems:  Appetite: Normal  Sleep: Normal  Pt reports feeling better  Pt denies GI symptoms such as N/V; Pt denies dizziness/HA    Side Effects:  None reported or admitted to. Past Medical History:   Active Ambulatory Problems     Diagnosis Date Noted    No Active Ambulatory Problems     Resolved Ambulatory Problems     Diagnosis Date Noted    No Resolved Ambulatory Problems     Past Medical History:   Diagnosis Date    Adrenal insufficiency (HCC)     Anemia     Anxiety     Arthritis     Atrial fibrillation (HCC)     Bell's palsy     CAD (coronary artery disease)     Depression     Diabetes mellitus (HCC)     Diabetic neuropathy (HCC)     Diastolic heart failure (HCC)     ESRD (end stage renal disease) (HCC)     HTN (hypertension)     Hyperlipidemia     Hypopituitarism ([address]     Morbid obesity (HCC)     Myocardial infarction ([address]     NORAH (obstructive sleep apnea)      Past medical history has been reviewed (see dictated report) with no additional updates (I asked patient and no additional past medical history provided). Social History:  Social history has been reviewed (see dictated report) with no additional updates (I asked patient and no additional social history provided). Family History:  Family history has been reviewed (see dictated report) with no additional updates (I asked patient and no additional family history provided).     OBJECTIVE:                 MENTAL STATUS EXAM:   FINDINGS WITHIN NORMAL LIMITS (WNL) UNLESS OTHERWISE STATED BELOW:    Orientation oriented to time, place and person   Vital Signs See below (reviewed)   Gait Within normal limits   Abnormal Muscular Movements/Tone/Behavior No EPS, no evidence of TD, within normal limits   Relations cooperative   General Appearance:  older than stated age   Speech/Language:  normal pitch, normal volume and non-pressured   Thought Process: logical and within normal limits   Thought Content free of delusions, free of hallucinations and not internally preoccupied    Suicidal Ideations death wishes   Homicidal Ideations none   Mood:  anxious and depressed   Affect:  mood-congruent   Memory recent  adequate   Memory remote:  adequate   Concentration/Attention:  adequate   Fund of Knowledge Fair/average   Insight:  age appropriate   Reliability good   Judgment:  age appropriate     Pertinent data:  Patient Vitals for the past 8 hrs:   BP Temp Pulse Resp SpO2   02/22/21 0810 109/70 97.2 °F (36.2 °C) 65 18 97 %     Recent Results (from the past 24 hour(s))   GLUCOSE, POC    Collection Time: 02/21/21  4:04 PM   Result Value Ref Range    Glucose (POC) 177 (H) 65 - 100 mg/dL    Performed by [address] [place] [address], POC    Collection Time: 02/21/21  7:41 PM   Result Value Ref Range    Glucose (POC) 198 (H) 65 - 100 mg/dL    Performed by [address] [place] [address], POC    Collection Time: 02/22/21  7:40 AM   Result Value Ref Range    Glucose (POC) 121 (H) 65 - 100 mg/dL    Performed by Aury Alfonso (PCT)    GLUCOSE, POC    Collection Time: 02/22/21 10:58 AM   Result Value Ref Range    Glucose (POC) 133 (H) 65 - 100 mg/dL    Performed by Aury Alfonso (PCT)        Medications:  Current Facility-Administered Medications   Medication Dose Route Frequency    escitalopram oxalate (LEXAPRO) tablet 20 mg  20 mg Oral DAILY    OLANZapine (ZyPREXA) tablet 5 mg  5 mg Oral Q6H PRN    haloperidol lactate (HALDOL) injection 5 mg  5 mg IntraMUSCular Q6H PRN    benztropine (COGENTIN) tablet 1 mg  1 mg Oral BID PRN    diphenhydrAMINE (BENADRYL) injection 50 mg  50 mg IntraMUSCular BID PRN    hydrOXYzine HCL (ATARAX) tablet 50 mg  50 mg Oral TID PRN    LORazepam (ATIVAN) injection 1 mg  1 mg IntraMUSCular Q4H PRN    traZODone (DESYREL) tablet 50 mg  50 mg Oral QHS PRN    acetaminophen (TYLENOL) tablet 650 mg  650 mg Oral Q4H PRN    magnesium hydroxide (MILK OF MAGNESIA) 400 mg/5 mL oral suspension 30 mL  30 mL Oral DAILY PRN    midodrine (PROAMATINE) tablet 10 mg  10 mg Oral DIALYSIS MON, WED & FRI    albumin human 25% (BUMINATE) solution 12.5 g  12.5 g IntraVENous DIALYSIS PRN    aspirin delayed-release tablet 81 mg  81 mg Oral DAILY    atorvastatin (LIPITOR) tablet 80 mg  80 mg Oral DAILY    calcium acetate (phosphate binder) (PHOSLO) tablet 2,001 mg  2,001 mg Oral TIDAC    diclofenac (VOLTAREN) 1 % topical gel 2 g  2 g Topical QID    gabapentin (NEURONTIN) capsule 400 mg  400 mg Oral QHS    gabapentin (NEURONTIN) capsule 200 mg  200 mg Oral DAILY    hydrocortisone (CORTEF) tablet 5 mg  5 mg Oral QPM    hydrocortisone (CORTEF) tablet 10 mg  10 mg Oral DAILY    insulin NPH (NOVOLIN N, HUMULIN N) injection 15 Units  15 Units SubCUTAneous QHS    insulin NPH (NOVOLIN N, HUMULIN N) injection 30 Units  30 Units SubCUTAneous ACB    metoprolol tartrate (LOPRESSOR) tablet 12.5 mg  12.5 mg Oral BID    fish oil-omega-3 fatty acids 340-1,000 mg capsule 1 Cap  1 Cap Oral DAILY  senna (SENOKOT) tablet 8.6 mg  1 Tab Oral DAILY    torsemide (DEMADEX) tablet 100 mg  100 mg Oral DAILY    B complex-vitaminC-folic acid (NEPHROCAP) cap  1 Cap Oral DAILY    insulin lispro (HUMALOG) injection   SubCUTAneous AC&HS    glucose chewable tablet 16 g  4 Tab Oral PRN    dextrose (D50W) injection syrg 12.5-25 g  12.5-25 g IntraVENous PRN    glucagon (GLUCAGEN) injection 1 mg  1 mg IntraMUSCular PRN       Allergies:  No Known Allergies    Scheduled Medications:  Current Facility-Administered Medications   Medication Dose Route Frequency    escitalopram oxalate (LEXAPRO) tablet 20 mg  20 mg Oral DAILY    midodrine (PROAMATINE) tablet 10 mg  10 mg Oral DIALYSIS MON, WED & FRI    aspirin delayed-release tablet 81 mg  81 mg Oral DAILY    atorvastatin (LIPITOR) tablet 80 mg  80 mg Oral DAILY    calcium acetate (phosphate binder) (PHOSLO) tablet 2,001 mg  2,001 mg Oral TIDAC    diclofenac (VOLTAREN) 1 % topical gel 2 g  2 g Topical QID    gabapentin (NEURONTIN) capsule 400 mg  400 mg Oral QHS    gabapentin (NEURONTIN) capsule 200 mg  200 mg Oral DAILY    hydrocortisone (CORTEF) tablet 5 mg  5 mg Oral QPM    hydrocortisone (CORTEF) tablet 10 mg  10 mg Oral DAILY    insulin NPH (NOVOLIN N, HUMULIN N) injection 15 Units  15 Units SubCUTAneous QHS    insulin NPH (NOVOLIN N, HUMULIN N) injection 30 Units  30 Units SubCUTAneous ACB    metoprolol tartrate (LOPRESSOR) tablet 12.5 mg  12.5 mg Oral BID    fish oil-omega-3 fatty acids 340-1,000 mg capsule 1 Cap  1 Cap Oral DAILY    senna (SENOKOT) tablet 8.6 mg  1 Tab Oral DAILY    torsemide (DEMADEX) tablet 100 mg  100 mg Oral DAILY    B complex-vitaminC-folic acid (NEPHROCAP) cap  1 Cap Oral DAILY    insulin lispro (HUMALOG) injection   SubCUTAneous AC&HS         ASSESSMENT/PLAN:   Patient is still with a severe exacerbation of condition which is worsening, not improving/stable  Diagnoses:  Patient Active Hospital Problem List:   Major depressive disorder (2/19/2021)    Plan:   Tramadol 50mg PRN for pain started. Disposition planning to continue with social work. Continue current treatment plan, monitor for safety      The following regarding medications was addressed during rounds with patient:   the risks and benefits of the proposed medication. The patient was given the opportunity to ask questions. Informed consent given to the use of the above medications. Will continue to adjust psychiatric and non-psychiatric medications   (see above \"medication\" section for details) as deemed appropriate &  based upon diagnoses and response to treatment. Will continue to order blood tests/labs and diagnostic tests as deemed appropriate and review results as they become available. Will continue to provide individual, milieu, occupational, group, and   substance abuse therapies to address target symptoms as deemed   appropriate for the individual patient.     EXPECTED DISCHARGE DATE (Day): TBD    DISPOSITION: Home    Signed By: Pedro Dubois MD

## 2021-02-22 NOTE — PROCEDURES
[facility] [place]  [phone]    Vitals   Pre   Post   Assessment   Pre   Post     Temp  Temp: 97.3 °F (36.3 °C) (02/22/21 1519)   LOC  Alert and oriented x 4 Alert and oriented x4   HR   Pulse (Heart Rate): 64 (02/22/21 1630) 73 Lungs   Clear  Denies any SOB or distress   B/P   BP: 116/74 (02/22/21 1630) 140/66 Cardiac   Regular Heart rhythm   regular heart rate    Resp   Resp Rate: 18 (02/22/21 1630) 18 Skin   Warm and dry   warm and dry    Pain level  Pain Intensity 1: 0 (02/22/21 0746) 0 Edema  Trace    Trace    Orders: Reviewed    Duration:   Start:    1630 End:    2030 Total:   4hrs   Dialyzer:   Dialyzer/Set Up Inspection: Leana Toledo (02/22/21 1630)   K Bath:   Dialysate K (mEq/L): 2 (02/22/21 1630)   Ca Bath:   Dialysate CA (mEq/L): 2.5 (02/22/21 1630)   Na/Bicarb:   Dialysate NA (mEq/L): 150 (02/22/21 1630)   Target Fluid Removal:   Goal/Amount of Fluid to Remove (mL): 4000 mL (02/22/21 1630)   Access  :Right AVF. No s/s of infection noted. + thrill and +bruit . Type & Location:      Labs     Obtained/Reviewed   Critical Results Called   Date when labs were drawn-  Hgb-  No results found for: HGB, HGBEXT  K-  No results found for: K  Ca- No results found for: CA  Bun- No results found for: BUN  Creat- No results found for: CREA     Medications/ Blood Products Given     Name   Dose   Route and Time     Albumin  12.5g   Via HD for low bp              Blood Volume Processed (BVP):    85.5 Net Fluid   Removed:  1500ml   Comments   Time Out Done: 1629  Primary Nurse Rpt Pre:Kaleigh Mathew RN  Primary Nurse Rpt Post: Winston Hough RN  Pt Education:Procedural and fluid management   Care Plan:Continue with HD care plan   Tx Summary:  1715:Patient bp low and albumin given. bp in the 80's will continue to monitor patient. 1745:Bp still low , patient asymptomatic. UF off will continue to monitor patient.    1830:UF turned on, goal decreased to low bp will continue to monitor patient. 1900:Bp  improved no s/s of distress. Patient tolerating treatment. 2030:Treatment completed with all possible blood returned. Patient tolerated treatment well with complaint of hip pain. Both needles removed and hemostasis achieved at 10mins hold. +thrill and + bruit . SBAR given to primary nurse and vitals stable upon departure.    Admiting Diagnosis:ESRD   [facility]   Consent signed - Informed Consent Verified: Yes (02/22/21 1630)  Hepatitis Status-  UNKNOWN   Machine #- Machine Number: [id number] (02/22/21 1630)  Telemetry status-NONE

## 2021-02-22 NOTE — BH NOTES
GROUP THERAPY PROGRESS NOTE    Patient is participating in Community Meeting/Discharge Planning/Goals Group. Group time: 50 minutes    Personal goal for participation: Process feelings and goals related to discharge    Goal orientation: Personal    Group therapy participation: active    Therapeutic interventions reviewed and discussed: Group discussion was focused on discharge plans and anxiety related to this. Group members discussed what they planned to do once discharge and discharge plans. Patients discussed their goals for today as well and what they are working on with treatment team to get ready for discharge. Impression of participation: Pt presented with depressed mood, clear speech, logical thought process. Pt stated he is feeling well today due to decrease in anxiety, lack of panic attacks, and better sleep. Pt processed the impact his physical health has on his mental health. Pt asked questions about discharge process, processed anxiety about leaving and his wife driving from [place] to pick him up. Pt's goal for the future is to eat more fruits and vegetables to improve physical health.     ABA Sheikh

## 2021-02-22 NOTE — BH NOTES
GROUP THERAPY PROGRESS NOTE    Patient is participating in Process Group. Group time: 50 minutes    Personal goal for participation: Increase self-awareness of internal and external triggers. Goal orientation: Personal    Group therapy participation: active    Therapeutic interventions reviewed and discussed: Group discussion was focused on internal and external triggers, and the feelings/behaviors elicited as a result. Group members analyzed their emotional triggers and how it affects their anxiety. Each person was asked to color in or write where they felt the most anxiety on the stick figure given. Then, the group discussed ways to de-escalate situational anxiety, and use the Emotional Freedom Technique, known as the tapping technique. Impression of participation: Eloina Kothari actively participated in group. Patient identified where he felt anxiety in his body. He processed his thoughts on dialysis and how that has taken a toll on his mental health. He expressed feeling better today in terms of his mood. Identified his support and ways he distracts himself. Cooperative in group.      Mei Evans, Supervisee in Social Work

## 2021-02-22 NOTE — INTERDISCIPLINARY ROUNDS
Behavioral Health Interdisciplinary Rounds Patient Name: Idania Espinoza  Age: 45 y.o. Room/Bed:  733/ Primary Diagnosis: Major depressive disorder Admission Status: Voluntary Readmission within 30 days: no 
Power of  in place: no 
Patient requires a blocked bed: no          Reason for blocked bed: VTE Prophylaxis: No 
 
Mobility needs/Fall risk: yes Flu Vaccine : no  
Nutritional Plan: no 
Consults:         
Labs/Testing due today?: yes Sleep hours:  7 Participation in Care/Groups:  yes Medication Compliant?: Yes PRNS (last 24 hours): Sleep Aid Restraints (last 24 hours):  no 
  
CIWA (range last 24 hours): COWS (range last 24 hours): Alcohol screening (AUDIT) completed -   AUDIT Score: 0 If applicable, date SBIRT discussed in treatment team AND documented:  
AUDIT Screen Score: AUDIT Score: 0 Document Brief Intervention (corresponds directly with the 5 A's, Ask, Advise, Assess, Assist, and Arrange): At- Risk Patients (Score 7-15 for women; 8-15 for men) Discuss concern patient is drinking at unhealthy levels known to increase risk of alcohol-related health problems. Is Patient ready to commit to change? If No: 
? Encourage reflection ? Discuss short term and long term health risks of consuming alcohol ? Barriers to change ? Reaffirm willingness to help / Educational materials provided If Yes: 
? Set goal 
? Plan 
? Educational materials provided Harmful use or Dependence (Score 16 or greater) ? Discuss short term and long term health risks of consuming alcohol ? Recommendations ? Negotiate drinking goal 
? Recommend addiction specialist/center ? Arrange follow-up appointments. Tobacco - patient is a smoker: Have You Used Tobacco in the Past 30 Days: No 
Illegal Drugs use: Have You Used Any Illegal Substances Over the Past 12 Months: No 
 
24 hour chart check complete: yes Admission:Patient was admitted to the ED at [facility] for SI with plant to OD. Precipitating factors include the stress of dialysis. Patient goal(s) for today: attend groups Treatment team focus/goals: medication mgmt and safe discharge Progress note: Pt presents as cooperative, pleasant, bright affect and joking with staff. He reports good sleep and states, \"I'm doin good. \"  He denies SI/HI/AVH. He has been declined for a kidney transplant at [facility] due to his mental health issues. He is currently being worked up for a kidney transplant at [facility]. Prior to this admission patient reports having a mental breakdown. Nephrologist suggested pt receive tramadol for knee pain during dialysis as this seems to be contributing to his depression. Pt is followed by Dr. Jennifer Watson at [facility]  Discharge is scheduled for Tuesday at 1800. LOS:  3  Expected LOS:  
 
Financial concerns/prescription coverage:  Medicare Family contact:  wife, Patricia Villareal ([phone]) Family requesting physician contact today:   
Discharge plan: return home Access to weapons :  no Outpatient provider(s): Dr. Jennifer Watson at [facility] Patient's preferred phone number for follow up call :  
Patient's preferred e-mail address : Melonie Eisenmenger Participating treatment team members: ABA Leon; Dr. Dilia Sandoval; JOSE Zuñiga; Olvin Borges RN

## 2021-02-23 VITALS
DIASTOLIC BLOOD PRESSURE: 76 MMHG | HEIGHT: 70 IN | BODY MASS INDEX: 40.8 KG/M2 | RESPIRATION RATE: 18 BRPM | HEART RATE: 73 BPM | TEMPERATURE: 98.2 F | OXYGEN SATURATION: 95 % | WEIGHT: 285 LBS | SYSTOLIC BLOOD PRESSURE: 125 MMHG

## 2021-02-23 LAB
GLUCOSE BLD STRIP.AUTO-MCNC: 104 MG/DL (ref 65–100)
GLUCOSE BLD STRIP.AUTO-MCNC: 77 MG/DL (ref 65–100)
GLUCOSE BLD STRIP.AUTO-MCNC: 87 MG/DL (ref 65–100)
SERVICE CMNT-IMP: ABNORMAL
SERVICE CMNT-IMP: NORMAL
SERVICE CMNT-IMP: NORMAL

## 2021-02-23 PROCEDURE — 74011636637 HC RX REV CODE- 636/637: Performed by: NURSE PRACTITIONER

## 2021-02-23 PROCEDURE — 82962 GLUCOSE BLOOD TEST: CPT

## 2021-02-23 PROCEDURE — 74011250637 HC RX REV CODE- 250/637: Performed by: NURSE PRACTITIONER

## 2021-02-23 RX ORDER — ESCITALOPRAM OXALATE 20 MG/1
20 TABLET ORAL DAILY
Qty: 30 TAB | Refills: 0 | Status: SHIPPED | OUTPATIENT
Start: 2021-02-24

## 2021-02-23 RX ORDER — HYDROXYZINE 50 MG/1
50 TABLET, FILM COATED ORAL
Qty: 90 TAB | Refills: 0 | Status: SHIPPED | OUTPATIENT
Start: 2021-02-23 | End: 2021-03-25

## 2021-02-23 RX ORDER — TRAZODONE HYDROCHLORIDE 50 MG/1
50 TABLET ORAL ONCE
Status: COMPLETED | OUTPATIENT
Start: 2021-02-23 | End: 2021-02-23

## 2021-02-23 RX ORDER — TRAZODONE HYDROCHLORIDE 50 MG/1
50 TABLET ORAL
Qty: 30 TAB | Refills: 0 | Status: SHIPPED | OUTPATIENT
Start: 2021-02-23

## 2021-02-23 RX ADMIN — HYDROCORTISONE 5 MG: 10 TABLET ORAL at 17:10

## 2021-02-23 RX ADMIN — Medication 2001 MG: at 17:09

## 2021-02-23 RX ADMIN — TORSEMIDE 100 MG: 100 TABLET ORAL at 08:31

## 2021-02-23 RX ADMIN — Medication 1 CAPSULE: at 08:29

## 2021-02-23 RX ADMIN — METOPROLOL TARTRATE 12.5 MG: 25 TABLET, FILM COATED ORAL at 17:09

## 2021-02-23 RX ADMIN — DICLOFENAC SODIUM 2 G: 10 GEL TOPICAL at 12:20

## 2021-02-23 RX ADMIN — GABAPENTIN 200 MG: 100 CAPSULE ORAL at 08:29

## 2021-02-23 RX ADMIN — Medication 8.6 MG: at 08:28

## 2021-02-23 RX ADMIN — DICLOFENAC SODIUM 2 G: 10 GEL TOPICAL at 08:32

## 2021-02-23 RX ADMIN — ESCITALOPRAM OXALATE 20 MG: 10 TABLET ORAL at 08:31

## 2021-02-23 RX ADMIN — METOPROLOL TARTRATE 12.5 MG: 25 TABLET, FILM COATED ORAL at 08:30

## 2021-02-23 RX ADMIN — Medication 2001 MG: at 11:50

## 2021-02-23 RX ADMIN — HYDROCORTISONE 10 MG: 10 TABLET ORAL at 08:28

## 2021-02-23 RX ADMIN — TRAZODONE HYDROCHLORIDE 50 MG: 50 TABLET ORAL at 02:16

## 2021-02-23 RX ADMIN — INSULIN HUMAN 30 UNITS: 100 INJECTION, SUSPENSION SUBCUTANEOUS at 08:27

## 2021-02-23 RX ADMIN — ASPIRIN 81 MG: 81 TABLET, COATED ORAL at 08:30

## 2021-02-23 RX ADMIN — Medication 2001 MG: at 06:21

## 2021-02-23 RX ADMIN — ATORVASTATIN CALCIUM 80 MG: 40 TABLET, FILM COATED ORAL at 08:30

## 2021-02-23 NOTE — BH NOTES
GROUP THERAPY PROGRESS NOTE    Patient is participating in [address] Group time: 50 minutes    Personal goal for participation: Writing letter to future you     Goal orientation: Personal    Group therapy participation: active    Therapeutic interventions reviewed and discussed: Group members participated in expressive writing. Patients spent 30-35 minutes writing a letter to themselves in the future. The goals are for patients to open this letter in about 3-6 months to see how far they have come since their hospitalization. Group members asked their self-questions like what lessons learned, goals achieved, current state of happiness, being true to self, worrying about things that matter, and practicing self-care. The second part of the letter, patients were asked to share their current beliefs: family, friends, health, relationships, career, spirituality, finance and money. Lastly, the third part of letter patients were to write what they want to change in the future. Participants were also given the option to freely write a letter to their future self that is different than the prompts given. Once patients completed this, they are to seal the letters in an envelope and take it home. They are to write a future open letter date. Group discussion was around feelings of writing letters and various goals set. Impression of participation: Ry Briggs actively participated in group. Patient was unable to write, and MSW assisted patient writing his letter. His goal is to get a kidney transplant, or be on the \"active list\" for one. Pt also set a goal to incorporate meditation into his daily life. Patient became tearful when talking about the loss of his sister and physical health. Presented with euthymic mood and clear speech.       Mei Evans, Supervisee in Social Work

## 2021-02-23 NOTE — PROGRESS NOTES
[facility]  Hospitalist Group                                                                                          Hospitalist Progress Note  Abigail Miguel NP  Answering service: [phone] from in house phone        Date of Service:  2021  NAME:  Joe Okeefe  :  1970  MRN:  [id number]      Admission Summary:   Ladan Hawley a 45 y. o. male with a PMH of Hx MI, Anemia of chronic disease, Major Depression, Anxiety, Osteoarthritis, Diastolic CHF, CAD, Neuropathy, ESRD on HD, HTN, HLD, Hypopituitarism /Adrenal Insufficiency, NORAH and T2DM who was transferred to [address] [place] [address] for management of Major Depression w/ SI's. Patient was taken to ED for worsening feelings of depression x1 week  to physical complications from missing dialysis session (d/t weather). After complete work- up deemed stable for transfer [address] [place] [address] The patient stated he feels he has progressive volume overload since missed dialysis, c/o SOB and Edema. He denied any other recent changes in his medication regimen or health. He stated he has regular f/u's with his PCP, cardiologist, and nephrologist.    Interval history / Subjective: Follow-up for issues listed below.   -Patient seen and examined, no c/o's. Assessment & Plan:     ESRD on HD: [facility], Dr. Cesilia De Paz  -Missed one episode hemodialysis about a week ago due to weather  -Dialyzed 2021  -Nephrology following- was not dialyzed , plan is to do should he dc as out patient  -labs w/ dialysis, difficult stick     HTN: hypotension with dialysis requiring midodrine and albumin  -continue home BB, hold for bradycardia/hypotension     CAD/HLD: denied CP, continue home aspirin, BB and statin. T2DM: ISS, basal, ACHS Accu-Cheks, hypoglycemia protocol. Hga1c: 11.  Diabetic Neuropathy: continue home gabapentin.   Hypopituitarism/Adrenal insufficiency: continue home hydrocortisone, monitor BP.  Secondary Hyperparathyroidism: continue home medications. TSH: pending. Anemia of Chronic Disease: no active bleeding, continue to monitor Hgb. NORAH: continue home CPAP. Osteoarthritis: continue home Voltaren gel, Tylenol as needed.     Major Depression/Anxiety/SI's: managed per Psych team.     DVTppx: up ad adan  GIppx: Pepcid  Code Status: Full Code  Diet: Diabetic   Activity: up ad adan  Discharge: TBD by primary care team. Thanks up for allowing us to participate in the care of this patient. We are signing off. Ambulates: walker     Hospital Problems  Never Reviewed          Codes Class Noted POA    * (Principal) Major depressive disorder ICD-10-CM: F32.9  ICD-9-CM: 296.20  2/19/2021 Unknown                Review of Systems:   A comprehensive review of systems was negative except for that written in the HPI. Vital Signs:    Last 24hrs VS reviewed since prior progress note. Most recent are:  Visit Vitals  /80   Pulse 64   Temp 97.7 °F (36.5 °C)   Resp 16   Ht 5' 10\" (1.778 m)   Wt 129.3 kg (285 lb)   SpO2 94%   BMI 40.89 kg/m²         Intake/Output Summary (Last 24 hours) at 2/23/2021 1252  Last data filed at 2/22/2021 2030  Gross per 24 hour   Intake    Output 1500 ml   Net -1500 ml        Physical Examination:     I had a face to face encounter with this patient and independently examined them on 2/23/2021 as outlined below:    Constitutional:  No acute distress, cooperative, pleasant    ENT:  Oral mucosa moist.    Resp:  CTA bilaterally. No wheezing/rhonchi/rales. No accessory muscle use. CV:  Regular rhythm, normal rate, S1,S2.    GI/:  Soft, non distended, non tender, no guarding, BS present. Voids Freely. Musculoskeletal:  No edema, warm, 2+ pulses throughout. Neurologic:  Moves all extremities. AAOx3, CN II-XII reviewed. Skin:  Left 2nd digit ulcers, amputated left great toe. Psych:  Good insight, Not anxious nor agitated.              Data Review:    Review and/or order of clinical lab test      Labs:     Recent Labs     02/22/21 1810   WBC 6.0   HGB 9.8*   HCT 29.0*        Recent Labs     02/22/21 1810      K 4.3      CO2 27   BUN 55*   CREA 7.46*   *  129*   CA 8.1*   PHOS 5.5*     Recent Labs     02/22/21 1810   ALB 3.4*     No results for input(s): INR, PTP, APTT, INREXT in the last 72 hours. No results for input(s): FE, TIBC, PSAT, FERR in the last 72 hours. No results found for: FOL, RBCF   No results for input(s): PH, PCO2, PO2 in the last 72 hours. No results for input(s): CPK, CKNDX, TROIQ in the last 72 hours.     No lab exists for component: CPKMB  Lab Results   Component Value Date/Time    Cholesterol, total 119 02/22/2021 06:10 PM    HDL Cholesterol 25 02/22/2021 06:10 PM    LDL, calculated 25.8 02/22/2021 06:10 PM    Triglyceride 341 (H) 02/22/2021 06:10 PM    CHOL/HDL Ratio 4.8 02/22/2021 06:10 PM     Lab Results   Component Value Date/Time    Glucose (POC) 87 02/23/2021 11:09 AM    Glucose (POC) 77 02/23/2021 07:24 AM    Glucose (POC) 142 (H) 02/22/2021 08:42 PM    Glucose (POC) 133 (H) 02/22/2021 10:58 AM    Glucose (POC) 121 (H) 02/22/2021 07:40 AM     No results found for: COLOR, APPRN, SPGRU, REFSG, GABE, PROTU, GLUCU, KETU, BILU, UROU, SONJA, LEUKU, GLUKE, EPSU, BACTU, WBCU, RBCU, CASTS, UCRY      Medications Reviewed:     Current Facility-Administered Medications   Medication Dose Route Frequency    traMADoL (ULTRAM) tablet 50 mg  50 mg Oral BID PRN    escitalopram oxalate (LEXAPRO) tablet 20 mg  20 mg Oral DAILY    OLANZapine (ZyPREXA) tablet 5 mg  5 mg Oral Q6H PRN    haloperidol lactate (HALDOL) injection 5 mg  5 mg IntraMUSCular Q6H PRN    benztropine (COGENTIN) tablet 1 mg  1 mg Oral BID PRN    diphenhydrAMINE (BENADRYL) injection 50 mg  50 mg IntraMUSCular BID PRN    hydrOXYzine HCL (ATARAX) tablet 50 mg  50 mg Oral TID PRN    LORazepam (ATIVAN) injection 1 mg  1 mg IntraMUSCular Q4H PRN    traZODone (DESYREL) tablet 50 mg  50 mg Oral QHS PRN    acetaminophen (TYLENOL) tablet 650 mg  650 mg Oral Q4H PRN    magnesium hydroxide (MILK OF MAGNESIA) 400 mg/5 mL oral suspension 30 mL  30 mL Oral DAILY PRN    midodrine (PROAMATINE) tablet 10 mg  10 mg Oral DIALYSIS MON, WED & FRI    albumin human 25% (BUMINATE) solution 12.5 g  12.5 g IntraVENous DIALYSIS PRN    aspirin delayed-release tablet 81 mg  81 mg Oral DAILY    atorvastatin (LIPITOR) tablet 80 mg  80 mg Oral DAILY    calcium acetate (phosphate binder) (PHOSLO) tablet 2,001 mg  2,001 mg Oral TIDAC    diclofenac (VOLTAREN) 1 % topical gel 2 g  2 g Topical QID    gabapentin (NEURONTIN) capsule 400 mg  400 mg Oral QHS    gabapentin (NEURONTIN) capsule 200 mg  200 mg Oral DAILY    hydrocortisone (CORTEF) tablet 5 mg  5 mg Oral QPM    hydrocortisone (CORTEF) tablet 10 mg  10 mg Oral DAILY    insulin NPH (NOVOLIN N, HUMULIN N) injection 15 Units  15 Units SubCUTAneous QHS    insulin NPH (NOVOLIN N, HUMULIN N) injection 30 Units  30 Units SubCUTAneous ACB    metoprolol tartrate (LOPRESSOR) tablet 12.5 mg  12.5 mg Oral BID    fish oil-omega-3 fatty acids 340-1,000 mg capsule 1 Cap  1 Cap Oral DAILY    senna (SENOKOT) tablet 8.6 mg  1 Tab Oral DAILY    torsemide (DEMADEX) tablet 100 mg  100 mg Oral DAILY    B complex-vitaminC-folic acid (NEPHROCAP) cap  1 Cap Oral DAILY    insulin lispro (HUMALOG) injection   SubCUTAneous AC&HS    glucose chewable tablet 16 g  4 Tab Oral PRN    dextrose (D50W) injection syrg 12.5-25 g  12.5-25 g IntraVENous PRN    glucagon (GLUCAGEN) injection 1 mg  1 mg IntraMUSCular PRN     ______________________________________________________________________  EXPECTED LENGTH OF STAY: - - -  ACTUAL LENGTH OF STAY:          4                 Farrah Walter NP

## 2021-02-23 NOTE — PROGRESS NOTES
Pt appears asleep in bed, NAD, even respirations, will continue to monitor q15min     Problem: Falls - Risk of  Goal: *Absence of Falls  Description: Document [address]iano Fall Risk and appropriate interventions in the flowsheet.   Outcome: Progressing Towards Goal  Note: Fall Risk Interventions:  Mobility Interventions: Utilize walker, cane, or other assistive device         Medication Interventions: Teach patient to arise slowly

## 2021-02-23 NOTE — SUICIDE SAFETY PLAN
SAFETY PLAN    A suicide Safety Plan is a document that supports someone when they are having thoughts of suicide. Warning Signs that indicate a suicidal crisis may be developing: What (situations, thoughts, feelings, body sensations, behaviors, etc.) do you experience that lets you know you are beginning to think about suicide? 1. Dialysis  2. Nighttime  3. Loneliness    Internal Coping Strategies:  What things can I do (relaxation techniques, hobbies, physical activities, etc.) to take my mind off my problems without contacting another person? 1. Comply with treatment  2. Distract myself  3. Spend time with my wife    People and social settings that provide distraction: Who can I call or where can I go to distract me? 1. Name: Wife  Phone:   2. Name: Neida Left  Phone:    3. Place: Shopping            4. Place: Traveling    People whom I can ask for help: Who can I call when I need help - for example, friends, family, clergy, someone else? 1. Name: Same as above                Phone:   2. Name:   Phone:   3. Name:   Phone:     Professionals or [address] [place] [address] I can contact during a crisis: Who can I call for help - for example, my doctor, my psychiatrist, my psychologist, a mental health provider, a suicide hotline? 1. Clinician Name:Duyen Haile NP with [facility]    Phone:       Clinician Pager or Emergency Contact #:     2. Clinician Name:    Phone:       Clinician Pager or Emergency Contact #:     3. Suicide Prevention Lifeline: 6-957-922-TALK (7965)    4. [address] [place] [address] Emergency Services -  for example, [facility] suicide hotline, [facility] Hotline: 211      Emergency Services Address: [address] [place] [address]      Emergency Services Phone: [phone]    Making the environment safe: How can I make my environment (house/apartment/living space) safer? For example, can I remove guns, medications, and other items? 1. Remove old meds  2.

## 2021-02-23 NOTE — DISCHARGE INSTRUCTIONS
DISCHARGE SUMMARY    NAME:Stefan Dupree  : 1970  MRN: [id number]    The patient Carmine Reis exhibits the ability to control behavior in a less restrictive environment. Patient's level of functioning is improving. No assaultive/destructive behavior has been observed for the past 24 hours. No suicidal/homicidal threat or behavior has been observed for the past 24 hours. There is no evidence of serious medication side effects. Patient has not been in physical or protective restraints for at least the past 24 hours. If weapons involved, how are they secured? NA    Is patient aware of and in agreement with discharge plan? yes    Arrangements for medication:  Prescriptions given to patient to fill    Copy of discharge instructions to provider?:  [address]  NephLeonid echavarria Ayden 9858 for transportation home:  Wife pickup at 18    Keep all follow up appointments as scheduled, continue to take prescribed medications per physician instructions. Mental health crisis numbers:  114 or [address] [facility] [address]: [phone]. DISCHARGE SUMMARY from Nurse    PATIENT INSTRUCTIONS:      What to do at Home:  Recommended activity: Activity as tolerated. *  Please give a list of your current medications to your Primary Care Provider. *  Please update this list whenever your medications are discontinued, doses are      changed, or new medications (including over-the-counter products) are added. *  Please carry medication information at all times in case of emergency situations. These are general instructions for a healthy lifestyle:    No smoking/ No tobacco products/ Avoid exposure to second hand smoke  Surgeon General's Warning:  Quitting smoking now greatly reduces serious risk to your health.     Obesity, smoking, and sedentary lifestyle greatly increases your risk for illness    A healthy diet, regular physical exercise & weight monitoring are important for maintaining a healthy lifestyle    You may be retaining fluid if you have a history of heart failure or if you experience any of the following symptoms:  Weight gain of 3 pounds or more overnight or 5 pounds in a week, increased swelling in our hands or feet or shortness of breath while lying flat in bed. Please call your doctor as soon as you notice any of these symptoms; do not wait until your next office visit. The discharge information has been reviewed with the patient. The patient verbalized understanding. Discharge medications reviewed with the patient and appropriate educational materials and side effects teaching were provided.   ___________________________________________________________________________________________________________________________________    Mental Health Emergency WARM LINE      [phone] ([phone])      M-F: 9am to 9pm      Sat & Sun: 5pm - 9pm  National suicide prevention lines:                             7-501-FARLLUD (1-249-625-7342)       9-356-639-TALK ([phone])   24/7 Crisis Text Line:  Text HOME to 952713

## 2021-02-23 NOTE — BH NOTES
Admission reviewed for medical necessity. Will follow with care [facility].

## 2021-02-23 NOTE — PROGRESS NOTES
Pharmacist Discharge Medication Reconciliation    Discharging Provider: Dr. Oksana Quick PMH:   Past Medical History:   Diagnosis Date    Adrenal insufficiency ([facility]     Anemia     Anxiety     Arthritis     Atrial fibrillation (HCC)     Bell's palsy     CAD (coronary artery disease)     Depression     Diabetes mellitus (HCC)     Diabetic neuropathy (HCC)     Diastolic heart failure (HCC)     ESRD (end stage renal disease) (HCC)     HTN (hypertension)     Hyperlipidemia     Hypopituitarism ([facility]     Morbid obesity ([facility]     Myocardial infarction ([facility]     NORAH (obstructive sleep apnea)      Chief Complaint for this Admission: No chief complaint on file. Allergies: Patient has no known allergies. Discharge Medications:   Current Discharge Medication List        START taking these medications    Details   escitalopram oxalate (LEXAPRO) 20 mg tablet Take 1 Tab by mouth daily. Indications: major depressive disorder  Qty: 30 Tab, Refills: 0      hydrOXYzine HCL (ATARAX) 50 mg tablet Take 1 Tab by mouth three (3) times daily as needed for Anxiety for up to 30 days. Indications: anxious  Qty: 90 Tab, Refills: 0      traZODone (DESYREL) 50 mg tablet Take 1 Tab by mouth nightly as needed for Sleep (For insomnia). Indications: insomnia associated with depression  Qty: 30 Tab, Refills: 0           CONTINUE these medications which have NOT CHANGED    Details   aspirin delayed-release 81 mg tablet Take 81 mg by mouth daily. calcium acetate (Calphron) 667 mg tab tablet Take 667 mg by mouth three (3) times daily (with meals). !! gabapentin (NEURONTIN) 100 mg capsule Take 100 mg by mouth two (2) times a day. !! gabapentin (NEURONTIN) 300 mg capsule Take 300 mg by mouth nightly. !! insulin NPH (HumuLIN N NPH U-100 Insulin) 100 unit/mL injection 15 Units by SubCUTAneous route nightly. !! insulin NPH (HumuLIN N NPH U-100 Insulin) 100 unit/mL injection 30 Units by SubCUTAneous route daily.       !! hydrocortisone (CORTEF) 10 mg tablet Take 5 mg by mouth every evening. !! hydrocortisone (CORTEF) 10 mg tablet Take 10 mg by mouth daily. atorvastatin (Lipitor) 80 mg tablet Take 80 mg by mouth daily. metoprolol tartrate (LOPRESSOR) 25 mg tablet Take 12.5 mg by mouth two (2) times a day. midodrine (PROAMATINE) 10 mg tablet Take  by mouth three (3) times daily as needed. Omega-3 Fatty Acids 60- mg cpDR Take 1 Tab by mouth daily. vit B,C-FA-zinc-selen-vit D3-E (RenaPlex-D) 800 mcg-12.5 mg -2,000 unit tab Take 1 Tab by mouth daily. torsemide (DEMADEX) 100 mg tablet Take 100 mg by mouth daily. diclofenac (Voltaren) 1 % gel Apply 2 g to affected area four (4) times daily. !! - Potential duplicate medications found. Please discuss with provider.         STOP taking these medications       risperiDONE (RisperDAL) 0.25 mg tablet Comments:   Reason for Stopping:         risperiDONE (RisperDAL) 0.5 mg tablet Comments:   Reason for Stopping:         ALPRAZolam (XANAX) 0.25 mg tablet Comments:   Reason for Stopping:         acetaminophen (TYLENOL) 500 mg tablet Comments:   Reason for Stopping:         testosterone (ANDROGEL) 1 % (25 mg/2.5gram) glpk Comments:   Reason for Stopping:         zolpidem (Ambien) 5 mg tablet Comments:   Reason for Stopping:         senna (Senna) 8.6 mg tablet Comments:   Reason for Stopping:             The patient's chart, MAR and AVS were reviewed by Elena Gunderson, PHARMD.

## 2021-02-23 NOTE — PROGRESS NOTES
Pharmacist Discharge Medication Reconciliation    Discharging Provider: Dr. Peter Nunez PMH:   Past Medical History:   Diagnosis Date    Adrenal insufficiency ([facility]     Anemia     Anxiety     Arthritis     Atrial fibrillation (HCC)     Bell's palsy     CAD (coronary artery disease)     Depression     Diabetes mellitus (HCC)     Diabetic neuropathy (HCC)     Diastolic heart failure (HCC)     ESRD (end stage renal disease) (HCC)     HTN (hypertension)     Hyperlipidemia     Hypopituitarism ([facility]     Morbid obesity ([facility]     Myocardial infarction ([facility]     NORAH (obstructive sleep apnea)      Chief Complaint for this Admission: No chief complaint on file. Allergies: Patient has no known allergies. Discharge Medications:   Current Discharge Medication List        START taking these medications    Details   escitalopram oxalate (LEXAPRO) 20 mg tablet Take 1 Tab by mouth daily. Indications: major depressive disorder  Qty: 30 Tab, Refills: 0      hydrOXYzine HCL (ATARAX) 50 mg tablet Take 1 Tab by mouth three (3) times daily as needed for Anxiety for up to 30 days. Indications: anxious  Qty: 90 Tab, Refills: 0      traZODone (DESYREL) 50 mg tablet Take 1 Tab by mouth nightly as needed for Sleep (For insomnia). Indications: insomnia associated with depression  Qty: 30 Tab, Refills: 0           CONTINUE these medications which have NOT CHANGED    Details   aspirin delayed-release 81 mg tablet Take 81 mg by mouth daily. calcium acetate (Calphron) 667 mg tab tablet Take 667 mg by mouth three (3) times daily (with meals). !! gabapentin (NEURONTIN) 100 mg capsule Take 100 mg by mouth two (2) times a day. !! gabapentin (NEURONTIN) 300 mg capsule Take 300 mg by mouth nightly. !! insulin NPH (HumuLIN N NPH U-100 Insulin) 100 unit/mL injection 15 Units by SubCUTAneous route nightly. !! insulin NPH (HumuLIN N NPH U-100 Insulin) 100 unit/mL injection 30 Units by SubCUTAneous route daily.       !! hydrocortisone (CORTEF) 10 mg tablet Take 5 mg by mouth every evening. !! hydrocortisone (CORTEF) 10 mg tablet Take 10 mg by mouth daily. atorvastatin (Lipitor) 80 mg tablet Take 80 mg by mouth daily. metoprolol tartrate (LOPRESSOR) 25 mg tablet Take 12.5 mg by mouth two (2) times a day. midodrine (PROAMATINE) 10 mg tablet Take  by mouth three (3) times daily as needed. Omega-3 Fatty Acids 60- mg cpDR Take 1 Tab by mouth daily. vit B,C-FA-zinc-selen-vit D3-E (RenaPlex-D) 800 mcg-12.5 mg -2,000 unit tab Take 1 Tab by mouth daily. torsemide (DEMADEX) 100 mg tablet Take 100 mg by mouth daily. diclofenac (Voltaren) 1 % gel Apply 2 g to affected area four (4) times daily. !! - Potential duplicate medications found. Please discuss with provider.         STOP taking these medications       risperiDONE (RisperDAL) 0.25 mg tablet Comments:   Reason for Stopping:         risperiDONE (RisperDAL) 0.5 mg tablet Comments:   Reason for Stopping:         ALPRAZolam (XANAX) 0.25 mg tablet Comments:   Reason for Stopping:         acetaminophen (TYLENOL) 500 mg tablet Comments:   Reason for Stopping:         testosterone (ANDROGEL) 1 % (25 mg/2.5gram) glpk Comments:   Reason for Stopping:         zolpidem (Ambien) 5 mg tablet Comments:   Reason for Stopping:         senna (Senna) 8.6 mg tablet Comments:   Reason for Stopping:             The patient's chart, MAR and AVS were reviewed by Filemon Gilliam PHARMD

## 2021-02-23 NOTE — PROGRESS NOTES
Problem: Depressed Mood (Adult/Pediatric)  Goal: *STG: Participates in treatment plan  Outcome: Progressing Towards Goal  Note: He is engaged, participating, denies SI, no self harming behaviors, affect was sad, mood is hopeful. Daily goal is to discuss discharge plans; staff focus is on medication and coping skills education.    Goal: *STG: Verbalizes anger, guilt, and other feelings in a constructive manor  Outcome: Progressing Towards Goal  Goal: *STG: Attends activities and groups  Outcome: Progressing Towards Goal  Goal: *STG: Complies with medication therapy  Outcome: Progressing Towards Goal  Goal: Interventions  Outcome: Progressing Towards Goal

## 2021-02-23 NOTE — SUICIDE SAFETY PLAN
SAFETY PLAN    A suicide Safety Plan is a document that supports someone when they are having thoughts of suicide. Warning Signs that indicate a suicidal crisis may be developing: What (situations, thoughts, feelings, body sensations, behaviors, etc.) do you experience that lets you know you are beginning to think about suicide? 1. Dialysis  2. Nighttime  3. Loneliness    Internal Coping Strategies:  What things can I do (relaxation techniques, hobbies, physical activities, etc.) to take my mind off my problems without contacting another person? 1. Comply with treatment  2. Distract myself  3. Spend time with my wife    People and social settings that provide distraction: Who can I call or where can I go to distract me? 1. Name: Wife  Phone:   2. Name: Jason Sebastian  Phone:    3. Place: Shopping            4. Place: Traveling    People whom I can ask for help: Who can I call when I need help - for example, friends, family, clergy, someone else? 1. Name: Same as above                Phone:   2. Name:   Phone:   3. Name:   Phone:     Professionals or [address] [place] [address] I can contact during a crisis: Who can I call for help - for example, my doctor, my psychiatrist, my psychologist, a mental health provider, a suicide hotline? 1. Clinician Name:Duyen Yin NP with [facility]    Phone:     2. Clinician Name: [facility] [address]: [employer]   Phone: [phone]     3. Suicide Prevention Lifeline: 7-879-687-TALK (7503)    4. [address] [place] [address] Emergency Services -  for example, [facility] suicide hotline, [address] [place] [address] Avenue: [facility]      Emergency Services Address: [address] [place] [address] [address]     Emergency Services Phone: [phone]    Making the environment safe: How can I make my environment (house/apartment/living space) safer? For example, can I remove guns, medications, and other items?   1. Program you local crisis number into your phone. 2. Have your support system program your local crisis number into their phone. 3. Keep medications in a lock box. Only keep a 3 day supply available at a time.    4. Remove any old prescriptions form the home

## 2021-02-23 NOTE — DISCHARGE SUMMARY
DISCHARGE SUMMARY    Some parts of the discharge summary are from the initial Psychiatric interview that was done on admission by the admitting psychiatrist.      Date of Admission: 2/19/2021    Date of Discharge:2/23/2021     TYPE OF DISCHARGE:   REGULAR -  YES    AMA  RELEASED BY THE [facility]     REASON FOR HOSPITALIZATION:  The patient was admitted to the inpatient psychiatric unit with suicidal ideations, increased depression and chronic medical comorbidity.     HISTORY OF PRESENT ILLNESS:   The patient Mis Brewer is a 45 y.o. .Malinda Sharpian past medical history of anemia of chronic disease, arthritis, diastolic congestive heart failure, coronary artery disease, neuropathy, ESRD on HD, hypertension, hyperlipidemia, adrenal insufficiency, obstructive sleep apnea, diabetes mellitus type 2 transferred to [facility] inpatient behavioral health unit from [facility] for inpatient management of suicidal ideation/depression. Ameya John was brought to the emergency department due to progressive feelings of depression, worsening over the past week due to physical complications from missing dialysis session.  No reported plan or current self-harm.  History of depression and anxiety. PAST MEDICAL HISTORY:       A. Psychiatric Disorders/Symptoms:   Major Depressive Disorder  Generalized Anxiety Disorder  Panic Disorder                   B. Substance Abuse:   Denies                    MEDICATIONS:                Prior to Admission Medications   Prescriptions Last Dose Informant Patient Reported? Taking? Omega-3 Fatty Acids 60- mg cpDR     Yes Yes   Sig: Take 1 Tab by mouth daily. acetaminophen (TYLENOL) 500 mg tablet     Yes Yes   Sig: Take 1,000 mg by mouth two (2) times daily as needed for Pain. aspirin delayed-release 81 mg tablet     Yes Yes   Sig: Take 81 mg by mouth daily. atorvastatin (Lipitor) 80 mg tablet     Yes Yes   Sig: Take 80 mg by mouth daily.    calcium acetate (Calphron) 667 mg tab tablet     Yes Yes   Sig: Take 2,001 mg by mouth three (3) times daily (with meals). diclofenac (Voltaren) 1 % gel     Yes Yes   Sig: Apply 2 g to affected area four (4) times daily. escitalopram oxalate (LEXAPRO) 20 mg tablet     Yes Yes   Sig: Take 20 mg by mouth daily. gabapentin (NEURONTIN) 100 mg capsule     Yes Yes   Sig: Take 200 mg by mouth daily. gabapentin (NEURONTIN) 300 mg capsule     Yes Yes   Sig: Take 400 mg by mouth nightly. hydrOXYzine pamoate (VISTARIL) 25 mg capsule     Yes Yes   Sig: Take 25 mg by mouth four (4) times daily as needed for Anxiety. hydrocortisone (CORTEF) 10 mg tablet     Yes Yes   Sig: Take 5 mg by mouth every evening. hydrocortisone (CORTEF) 10 mg tablet     Yes Yes   Sig: Take 10 mg by mouth daily. insulin NPH (HumuLIN N NPH U-100 Insulin) 100 unit/mL injection     Yes Yes   Sig: 15 Units by SubCUTAneous route nightly. insulin NPH (HumuLIN N NPH U-100 Insulin) 100 unit/mL injection     Yes Yes   Si Units by SubCUTAneous route daily. metoprolol tartrate (LOPRESSOR) 25 mg tablet     Yes Yes   Sig: Take 12.5 mg by mouth two (2) times a day. midodrine (PROAMATINE) 10 mg tablet     Yes Yes   Sig: Take  by mouth three (3) times daily as needed. promethazine (PHENERGAN) 25 mg tablet     Yes Yes   Sig: Take 25 mg by mouth every four (4) hours as needed for Nausea. senna (Senna) 8.6 mg tablet     Yes Yes   Sig: Take 1 Tab by mouth daily. testosterone (ANDROGEL) 1 % (25 mg/2.5gram) glpk     Yes Yes   Si.5 g by TransDERmal route daily. torsemide (DEMADEX) 100 mg tablet     Yes Yes   Sig: Take 100 mg by mouth daily. vit B,C-FA-zinc-selen-vit D3-E (RenaPlex-D) 800 mcg-12.5 mg -2,000 unit tab     Yes Yes   Sig: Take 1 Tab by mouth daily. zolpidem (Ambien) 5 mg tablet     Yes Yes   Sig: Take  by mouth nightly as needed for Sleep. Facility-Administered Medications: None            ALLERGIES:              He has No Known Allergies. SOCIAL HISTORY:  Currently lives at home with wife and daughter who is 16years old. Currently on disability due to chronic kidney failure and dialysis. Reports that wife is still working full time and he is completely dependant on her.                 FAMILY HISTORY:  Sister: Substance abuse history         REVIEW OF SYSTEMS:  Patient currently denies suicidal and homicidal ideation. Pt reports depression and anxiety, panic attacks. Pt denies auditory and visual hallucinations. Medical review of systems mainly considered within normal limits expect as noted in history above.     MENTAL STATUS EXAM:  Sensorium  oriented to time, place and person   Orientation person, place, time/date, situation, day of week, month of year and year   Relations cooperative   Eye Contact appropriate   Appearance:  disheveled   Motor Behavior:  within normal limits   Speech:  normal pitch, normal volume and non-pressured   Vocabulary average   Thought Process: goal directed and logical   Thought Content free of delusions, free of hallucinations and not internally preoccupied    Suicidal ideations death wishes   Homicidal ideations none   Mood:  depressed and sad   Affect:  mood-congruent   Memory recent  adequate   Memory remote:  adequate   Concentration:  adequate   Abstraction:  abstract   Insight:  age appropriate   Reliability good   Judgment:  age appropriate      ASSESSMENT:  The patient is a 45 y.o.  male with a history of chronic depression and anxiety with multiple chronic medical conditions. He states that he has chronic depression but his  suicidal ideations  cry for help and that he would never actually hurt himself due to his herminio. He states that his dialysis is exhausting and he is in constant pain and he is always being told that there is nothing that he can take or that can help him due to his kidney failure.   He states that missing dialysis and then ends up in the hospital due to fluid overload and it is a revolving door. He states that he missing dialysis due to the chronic pain in his knees and he cannot sit in the chairs at the dialysis center for 4 hours. He states that he has tried to get on the kidney transplant list but has continuously been declined. He states that he does not know the resolution because his depression is caused by chronic medical issues and those are not going to stop and he has to complete his dialysis so he does not see an end to the situation.            MENTAL STATUS EXAM:  The patient is a middle age  male who is dressed in hospital apparel. Luis Alfredo Busby is somewhat anxious and tearful during the interview.  Speech is normal rate and rhythm, non-pressured.  Psychomotor activity is WNL.  Mood is reported as being very upset frustrated, depressed and sad and affect is congruent, patient is tearful. .  Denies any perceptual abnormalities.  Denies homicidal ideation. .  Denies suicidal ideation.  Denies any delusions.  His thought process is goal is goal oriented and logical.  Cognitively, he is awake and alert.  He is cooperative with full cognitive assessment.  Insight is Good . Judgement is Good     PLAN  We will continue with the inpatient psychiatric treatment. While on the unit, patient will be involved in individual, group, milieu and occupational therapy. We will continue to obtain collateral information. Patient meets criteria for MDD, HERBERTH, Panic Disorder.         ESTIMATED LENGTH OF STAY:   5-7 days          Course in the Hospital:     Patient was admitted to the inpatient psychiatry unit for acute psychiatric stabilization in regards to symptomatology as described in the HPI above and placed on Q15 minute checks and withdrawal precautions. While on the unit Adin Vigil was involved in individual, group, occupational and milieu therapy.  He was started back on his usual medication regimen as well as PRN medications including Lexapro, Trazodone for sleep, and Vistaril for anxiety. He improved gradually and was able to integrate into the milieu with help from the nursing staff. Patients symptoms improved gradually including poor sleep, low energy, depressed mood, anxiety, SI. He was quite on the unit, appropriate in his interactions, and cooperative with medications and the unit routine. Please see individual progress notes for more specific details regarding patient's hospitalization course. Patient was discharged as per the plan. He had been doing well on the unit as per the report of the nursing staff and my observations. No PRN medication for agitation, seclusion or restraints were required during the last 48 hours of her stay. Nasim Dill had improved progressively to the point of being stable for discharge and outpatient FU. At this time he did not offer any complaints. Patient denied any SI or HI. Denied any AH or VH. He denied any delusions. Was not considered a danger to self or to others and is safe for discharge. Will FU with his appointments and remains motivated to be in treatment. The patient verbalized understanding of his discharge instructions. DISCHARGE DIAGNOSIS:  MDD, moderate. HERBERTH, Panic Disorder without agoraphobia. MENTAL STATUS EXAM ON DISCHARGE:    General appearance:   Nasim Dill is a 45 y.o.  male who is well groomed, psychomotor activity is WNL  Eye contact: makes good eye contact  Speech: Spontaneous and coherent  Affect : Euthymic  Mood: \"OK\"  Thought Process: Logical, goal directed  Perception: Denies any AH or VH. Thought Content: Denies any SI or Plan  Insight: Partial  Judgement: Fair  Cognition: Intact grossly. Current Discharge Medication List      START taking these medications    Details   escitalopram oxalate (LEXAPRO) 20 mg tablet Take 1 Tab by mouth daily.  Indications: major depressive disorder  Qty: 30 Tab, Refills: 0      hydrOXYzine HCL (ATARAX) 50 mg tablet Take 1 Tab by mouth three (3) times daily as needed for Anxiety for up to 30 days. Indications: anxious  Qty: 90 Tab, Refills: 0         CONTINUE these medications which have NOT CHANGED    Details   aspirin delayed-release 81 mg tablet Take 81 mg by mouth daily. calcium acetate (Calphron) 667 mg tab tablet Take 667 mg by mouth three (3) times daily (with meals). !! insulin NPH (HumuLIN N NPH U-100 Insulin) 100 unit/mL injection 15 Units by SubCUTAneous route nightly. !! insulin NPH (HumuLIN N NPH U-100 Insulin) 100 unit/mL injection 30 Units by SubCUTAneous route daily. !! hydrocortisone (CORTEF) 10 mg tablet Take 5 mg by mouth every evening. !! hydrocortisone (CORTEF) 10 mg tablet Take 10 mg by mouth daily. atorvastatin (Lipitor) 80 mg tablet Take 80 mg by mouth daily. metoprolol tartrate (LOPRESSOR) 25 mg tablet Take 12.5 mg by mouth two (2) times a day. midodrine (PROAMATINE) 10 mg tablet Take  by mouth three (3) times daily as needed. Omega-3 Fatty Acids 60- mg cpDR Take 1 Tab by mouth daily. vit B,C-FA-zinc-selen-vit D3-E (RenaPlex-D) 800 mcg-12.5 mg -2,000 unit tab Take 1 Tab by mouth daily. torsemide (DEMADEX) 100 mg tablet Take 100 mg by mouth daily. diclofenac (Voltaren) 1 % gel Apply 2 g to affected area four (4) times daily. !! - Potential duplicate medications found. Please discuss with provider.       STOP taking these medications       risperiDONE (RisperDAL) 0.25 mg tablet Comments:   Reason for Stopping:         risperiDONE (RisperDAL) 0.5 mg tablet Comments:   Reason for Stopping:         ALPRAZolam (XANAX) 0.25 mg tablet Comments:   Reason for Stopping:         acetaminophen (TYLENOL) 500 mg tablet Comments:   Reason for Stopping:         testosterone (ANDROGEL) 1 % (25 mg/2.5gram) glpk Comments:   Reason for Stopping:         zolpidem (Ambien) 5 mg tablet Comments:   Reason for Stopping:         senna (Senna) 8.6 mg tablet Comments:   Reason for Stopping:              No results found for: Trish Northern, VALP, VALPR, DS6, CRBAM, CRBAMP, CARB2, XCRBAM  No results found for: LITHM  Follow-up Information     Follow up With Specialties Details Why [address]     Dr. Ruthann Bolivar an appointment as soon as possible for a visit  [facility]  [address], [facility], [address] [facility]   [phone] [address] [facility]  Call Follow up for community services if needed Several locations  [phone]     Abdullahi Mccormack NP  On 3/15/2021 3:30pm VIRTUAL appt for med mgmt [address]  Phone: [phone]   Fax: [phone]       Bsi, Not On File    Not On File (62) Patient has a PCP but that physician is not listed in [facility]. WOUND CARE: none needed. PROGNOSIS:   Good / Fair based on nature of patient's pathology/ies and treatment compliance issues. Prognosis is greatly dependent upon patient's ability to  follow up on psychiatric/psychotherapy appointments as well as to comply with psychiatric medications as prescribed.

## 2021-02-23 NOTE — BH NOTES
GROUP THERAPY PROGRESS NOTE    Patient is participating in a Process Group. Group time: 50 minutes     Personal goal for participation: To discuss past and current mental health challenges     Goal orientation: Personal    Group therapy participation: active    Therapeutic interventions reviewed and discussed: Group discussion was focused on the handout If [address] a Color using symbolism to conceptualize past and current mental health challenges. Patients processed the ways in which their mental health has impacted their life and identified ways to cope with these challenges. Patients also participated in discussion regarding how to use symbolism to explain mental health challenges to their support systems. Impression of participation: Pt presented with euthymic mood, clear speech, logical thought process. Pt completed activity, compared his mental health challenges to the color yellow because he experiences anxiety which is \"bright and intense. \" Pt compared his mental health challenges to a tiger because they are destructive. Pt processed the impact the seasons and weather have on his mental health, feels he begins to decline in early fall through winter.     ABA Cabrera

## 2021-02-23 NOTE — PROGRESS NOTES
Problem: Depressed Mood (Adult/Pediatric)  Goal: *STG: Participates in treatment plan  Outcome: Progressing Towards Goal  Affect is blunted with some irritability. Pt spent much of the evening in dialysis. Pt declined POC glucose at dinner.   2132  Trazadone 50 mg given per pt request, to promote rest.

## 2021-02-23 NOTE — BH NOTES
Behavioral Health Transition Record to Provider    Patient Name: Dominique Salguero  YOB: 1970  Medical Record Number: [id number]  Date of Admission: 2/19/2021  Date of Discharge: 2/23/2021    Attending Provider: Dianna Azul MD  Discharging Provider: Dr. Juju Wu  To contact this individual call [phone] and ask the  to page. If unavailable, ask to be transferred to [address] [place] [address] Provider on call. [facility] Provider will be available on call 24/7 and during holidays. Primary Care Provider: [place], Not On File    No Known Allergies    Reason for Admission: The patient was admitted to the inpatient psychiatric unit with suicidal ideations, increased depression and chronic medical comorbidity. Admission Diagnosis: Major depressive disorder [F32.9]    * No surgery found *    Results for orders placed or performed during the hospital encounter of 02/19/21   CULTURE, MRSA    Specimen: Nares; Nasal   Result Value Ref Range    Special Requests: NO SPECIAL REQUESTS      Culture result: MRSA NOT PRESENT      Culture result:        Screening of patient nares for MRSA is for surveillance purposes and, if positive, to facilitate isolation considerations in high risk settings. It is not intended for automatic decolonization interventions per se as regimens are not sufficiently effective to warrant routine use. SARS-COV-2, PCR    Specimen: Nasopharyngeal   Result Value Ref Range    Specimen source Nasopharyngeal      SARS-CoV-2 Not detected NOTD     SARS-COV-2   Result Value Ref Range    SARS-CoV-2 Please find results under separate order     HEP B SURFACE AG   Result Value Ref Range    Hepatitis B surface Ag <0.10 Index    Hep B surface Ag Interp. Negative NEG     HEP B SURFACE AB   Result Value Ref Range    Hepatitis B surface Ab 13.47 mIU/mL    Hep B surface Ab Interp.  REACTIVE (A) NR     CBC WITH AUTOMATED DIFF   Result Value Ref Range    WBC 6.0 4.1 - 11.1 K/uL    RBC 2.90 (L) 4.10 - 5.70 M/uL    HGB 9.8 (L) 12.1 - 17.0 g/dL    HCT 29.0 (L) 36.6 - 50.3 %    .0 (H) 80.0 - 99.0 FL    MCH 33.8 26.0 - 34.0 PG    MCHC 33.8 30.0 - 36.5 g/dL    RDW 13.7 11.5 - 14.5 %    PLATELET 449 529 - 932 K/uL    MPV 10.4 8.9 - 12.9 FL    NRBC 0.0 0  WBC    ABSOLUTE NRBC 0.00 0.00 - 0.01 K/uL    NEUTROPHILS 60 32 - 75 %    LYMPHOCYTES 23 12 - 49 %    MONOCYTES 11 5 - 13 %    EOSINOPHILS 4 0 - 7 %    BASOPHILS 1 0 - 1 %    IMMATURE GRANULOCYTES 1 (H) 0.0 - 0.5 %    ABS. NEUTROPHILS 3.6 1.8 - 8.0 K/UL    ABS. LYMPHOCYTES 1.4 0.8 - 3.5 K/UL    ABS. MONOCYTES 0.6 0.0 - 1.0 K/UL    ABS. EOSINOPHILS 0.2 0.0 - 0.4 K/UL    ABS. BASOPHILS 0.1 0.0 - 0.1 K/UL    ABS. IMM.  GRANS. 0.0 0.00 - 0.04 K/UL    DF AUTOMATED     GLUCOSE, FASTING   Result Value Ref Range    Glucose 136 (H) 65 - 100 MG/DL   LIPID PANEL   Result Value Ref Range    LIPID PROFILE          Cholesterol, total 119 <200 MG/DL    Triglyceride 341 (H) <150 MG/DL    HDL Cholesterol 25 MG/DL    LDL, calculated 25.8 0 - 100 MG/DL    VLDL, calculated 68.2 MG/DL    CHOL/HDL Ratio 4.8 0.0 - 5.0     RENAL FUNCTION PANEL   Result Value Ref Range    Sodium 137 136 - 145 mmol/L    Potassium 4.3 3.5 - 5.1 mmol/L    Chloride 100 97 - 108 mmol/L    CO2 27 21 - 32 mmol/L    Anion gap 10 5 - 15 mmol/L    Glucose 129 (H) 65 - 100 mg/dL    BUN 55 (H) 6 - 20 MG/DL    Creatinine 7.46 (H) 0.70 - 1.30 MG/DL    BUN/Creatinine ratio 7 (L) 12 - 20      GFR est AA 9 (L) >60 ml/min/1.73m2    GFR est non-AA 8 (L) >60 ml/min/1.73m2    Calcium 8.1 (L) 8.5 - 10.1 MG/DL    Phosphorus 5.5 (H) 2.6 - 4.7 MG/DL    Albumin 3.4 (L) 3.5 - 5.0 g/dL   TSH 3RD GENERATION   Result Value Ref Range    TSH 1.68 0.36 - 3.74 uIU/mL   GLUCOSE, POC   Result Value Ref Range    Glucose (POC) 167 (H) 65 - 100 mg/dL    Performed by [facility] RUTHANN    GLUCOSE, POC   Result Value Ref Range    Glucose (POC) 92 65 - 100 mg/dL    Performed by Jyoti Cowan    GLUCOSE, POC   Result Value Ref Range Glucose (POC) 140 (H) 65 - 100 mg/dL    Performed by Shanna Manzano    GLUCOSE, POC   Result Value Ref Range    Glucose (POC) 137 (H) 65 - 100 mg/dL    Performed by Nidhi Dowling    GLUCOSE, POC   Result Value Ref Range    Glucose (POC) 179 (H) 65 - 100 mg/dL    Performed by Nidhi Dowling    GLUCOSE, POC   Result Value Ref Range    Glucose (POC) 107 (H) 65 - 100 mg/dL    Performed by Rodney Aleman    GLUCOSE, POC   Result Value Ref Range    Glucose (POC) 149 (H) 65 - 100 mg/dL    Performed by Rodney Aleman    GLUCOSE, POC   Result Value Ref Range    Glucose (POC) 177 (H) 65 - 100 mg/dL    Performed by Seda Ramirez    GLUCOSE, POC   Result Value Ref Range    Glucose (POC) 198 (H) 65 - 100 mg/dL    Performed by Seda Ramirez    GLUCOSE, POC   Result Value Ref Range    Glucose (POC) 121 (H) 65 - 100 mg/dL    Performed by [facility] HALEY ([facility])    GLUCOSE, POC   Result Value Ref Range    Glucose (POC) 133 (H) 65 - 100 mg/dL    Performed by GARO DE LEON (PCT)    GLUCOSE, POC   Result Value Ref Range    Glucose (POC) 142 (H) 65 - 100 mg/dL    Performed by Radha Phillip    GLUCOSE, POC   Result Value Ref Range    Glucose (POC) 77 65 - 100 mg/dL    Performed by Misha Stovall (PCT)        Immunizations administered during this encounter: There is no immunization history on file for this patient. Screening for Metabolic Disorders for Patients on Antipsychotic Medications  (Data obtained from the EMR)    Estimated Body Mass Index  Estimated body mass index is 40.89 kg/m² as calculated from the following:    Height as of this encounter: 5' 10\" (1.778 m). Weight as of this encounter: 129.3 kg (285 lb).      Vital Signs/Blood Pressure  Visit Vitals  /80   Pulse 64   Temp 97.7 °F (36.5 °C)   Resp 16   Ht 5' 10\" (1.778 m)   Wt 129.3 kg (285 lb)   SpO2 94%   BMI 40.89 kg/m²       Blood Glucose/Hemoglobin A1c  Lab Results   Component Value Date/Time    Glucose 136 (H) 02/22/2021 06:10 PM    Glucose 129 (H) 02/22/2021 06:10 PM    Glucose (POC) 77 02/23/2021 07:24 AM       No results found for: HBA1C, HGBE8, [id number]     Lipid Panel  Lab Results   Component Value Date/Time    Cholesterol, total 119 02/22/2021 06:10 PM    HDL Cholesterol 25 02/22/2021 06:10 PM    LDL, calculated 25.8 02/22/2021 06:10 PM    Triglyceride 341 (H) 02/22/2021 06:10 PM    CHOL/HDL Ratio 4.8 02/22/2021 06:10 PM        Discharge Diagnosis: MDD, moderate. HERBERTH, Panic Disorder without agoraphobia. Discharge Plan: The patient Mis Brewer exhibits the ability to control behavior in a less restrictive environment. Patient's level of functioning is improving. No assaultive/destructive behavior has been observed for the past 24 hours. No suicidal/homicidal threat or behavior has been observed for the past 24 hours. There is no evidence of serious medication side effects. Patient has not been in physical or protective restraints for at least the past 24 hours. If weapons involved, how are they secured? NA    Is patient aware of and in agreement with discharge plan? yes    Arrangements for medication:  Prescriptions given to patient to fill    Copy of discharge instructions to provider?:  [address]  NephrologyLeonid Yaden 9827 for transportation home:  Wife pickup at 18    Keep all follow up appointments as scheduled, continue to take prescribed medications per physician instructions.     Mental health crisis numbers:  436 or 970 [facility]: [address] [facility] WARM LINE      [phone] ([phone])      M-F: 9am to 9pm      Sat & Sun: 5pm  9pm  National suicide prevention lines:                             4-916-MUUWYVZ ([phone])       5-290-663-TALK ([phone])   24/7 Crisis Text Line:  Text HOME to [phone]      Discharge Medication List and Instructions:   Current Discharge Medication List      START taking these medications    Details   escitalopram oxalate (LEXAPRO) 20 mg tablet Take 1 Tab by mouth daily. Indications: major depressive disorder  Qty: 30 Tab, Refills: 0      hydrOXYzine HCL (ATARAX) 50 mg tablet Take 1 Tab by mouth three (3) times daily as needed for Anxiety for up to 30 days. Indications: anxious  Qty: 90 Tab, Refills: 0         CONTINUE these medications which have NOT CHANGED    Details   aspirin delayed-release 81 mg tablet Take 81 mg by mouth daily. calcium acetate (Calphron) 667 mg tab tablet Take 667 mg by mouth three (3) times daily (with meals). !! insulin NPH (HumuLIN N NPH U-100 Insulin) 100 unit/mL injection 15 Units by SubCUTAneous route nightly. !! insulin NPH (HumuLIN N NPH U-100 Insulin) 100 unit/mL injection 30 Units by SubCUTAneous route daily. !! hydrocortisone (CORTEF) 10 mg tablet Take 5 mg by mouth every evening. !! hydrocortisone (CORTEF) 10 mg tablet Take 10 mg by mouth daily. atorvastatin (Lipitor) 80 mg tablet Take 80 mg by mouth daily. metoprolol tartrate (LOPRESSOR) 25 mg tablet Take 12.5 mg by mouth two (2) times a day. midodrine (PROAMATINE) 10 mg tablet Take  by mouth three (3) times daily as needed. Omega-3 Fatty Acids 60- mg cpDR Take 1 Tab by mouth daily. vit B,C-FA-zinc-selen-vit D3-E (RenaPlex-D) 800 mcg-12.5 mg -2,000 unit tab Take 1 Tab by mouth daily. torsemide (DEMADEX) 100 mg tablet Take 100 mg by mouth daily. diclofenac (Voltaren) 1 % gel Apply 2 g to affected area four (4) times daily. !! - Potential duplicate medications found. Please discuss with provider.       STOP taking these medications       risperiDONE (RisperDAL) 0.25 mg tablet Comments:   Reason for Stopping:         risperiDONE (RisperDAL) 0.5 mg tablet Comments:   Reason for Stopping:         ALPRAZolam (XANAX) 0.25 mg tablet Comments:   Reason for Stopping:         acetaminophen (TYLENOL) 500 mg tablet Comments:   Reason for Stopping:         testosterone (ANDROGEL) 1 % (25 mg/2.5gram) glpk Comments:   Reason for Stopping:         zolpidem (Ambien) 5 mg tablet Comments:   Reason for Stopping:         senna (Senna) 8.6 mg tablet Comments:   Reason for Stopping:               Unresulted Labs (24h ago, onward)     Start     Ordered    86/51/48 5473  METABOLIC PANEL, COMPREHENSIVE  EVERY OTHER DAY,   R     Start Priority Status   02/24/21 1815  Scheduled   02/26/21 1815  Scheduled   02/28/21 1815  Scheduled   03/02/21 1815  Scheduled   03/04/21 1815  Scheduled   03/06/21 1815  Scheduled   03/08/21 1815  Scheduled   03/10/21 1815  Scheduled       02/22/21 1806    02/24/21 1805  PHOSPHORUS  EVERY OTHER DAY,   R     Start Priority Status   02/24/21 1815  Scheduled   02/26/21 1815  Scheduled   02/28/21 1815  Scheduled   03/02/21 1815  Scheduled   03/04/21 1815  Scheduled   03/06/21 1815  Scheduled   03/08/21 1815  Scheduled   03/10/21 1815  Scheduled       02/22/21 1806    02/24/21 1804  CBC W/O DIFF  EVERY OTHER DAY,   R     Start Priority Status   02/24/21 1815  Scheduled   02/26/21 1815  Scheduled   02/28/21 1815  Scheduled   03/02/21 1815  Scheduled   03/04/21 1815  Scheduled   03/06/21 1815  Scheduled   03/08/21 1815  Scheduled   03/10/21 1815  Scheduled       02/22/21 1806    02/24/21 1804  MAGNESIUM  EVERY OTHER DAY,   R     Start Priority Status   02/24/21 1815  Scheduled   02/26/21 1815  Scheduled   02/28/21 1815  Scheduled   03/02/21 1815  Scheduled   03/04/21 1815  Scheduled   03/06/21 1815  Scheduled   03/08/21 1815  Scheduled   03/10/21 1815  Scheduled       02/22/21 1806              To obtain results of studies pending at discharge, please contact [phone]    Follow-up Information     Follow up With Specialties Details Why Contact Info     Dr. Augusta Paul an appointment as soon as possible for a visit  [facility]  [address] [facility], [place], [address] [address]   [phone] [address] [facility]  Call Follow up for [facility] services if needed Several locations  [phone]     Abdullahi Mccormack NP  On 3/15/2021 3:30pm VIRTUAL appt for med [facility] [address]  Phone: [phone]   Fax: [phone]       [facility], Not On File    Not On File (62) Patient has a PCP but that physician is not listed in [facility]. Advanced Directive:   Does the patient have an appointed surrogate decision maker? No  Does the patient have a Medical Advance Directive? No  Does the patient have a Psychiatric Advance Directive? No  If the patient does not have a surrogate or Medical Advance Directive AND Psychiatric Advance Directive, the patient was offered information on these advance directives Patient declined to complete    Patient Instructions: Please continue all medications until otherwise directed by physician. Tobacco Cessation Discharge Plan:   Is the patient a smoker and needs referral for smoking cessation? No  Patient referred to the following for smoking cessation with an appointment? No     Patient was offered medication to assist with smoking cessation at discharge? No  Was education for smoking cessation added to the discharge instructions? Yes    Alcohol/Substance Abuse Discharge Plan:   Does the patient have a history of substance/alcohol abuse and requires a referral for treatment? No  Patient referred to the following for substance/alcohol abuse treatment with an appointment? No  Patient was offered medication to assist with alcohol cessation at discharge? No  Was education for substance/alcohol abuse added to discharge instructions? No    Patient discharged to Home; discussed with patient/caregiver and provided to the patient/caregiver either in hard copy or electronically.

## 2021-02-23 NOTE — INTERDISCIPLINARY ROUNDS
Behavioral Health Interdisciplinary Rounds Patient Name: Nan Dixon  Age: 45 y.o. Room/Bed:  3/ Primary Diagnosis: Major depressive disorder Admission Status: Voluntary Readmission within 30 days: no 
Power of  in place: no 
Patient requires a blocked bed: no          Reason for blocked bed: VTE Prophylaxis: No 
 
Mobility needs/Fall risk: yes Flu Vaccine : no  
Nutritional Plan: no 
Consults:         
Labs/Testing due today?: no 
 
Sleep hours: 6 broken Participation in Care/Groups:  yes Medication Compliant?: Selective PRNS (last 24 hours): Sleep Aid and Pain Restraints (last 24 hours):  no 
  
CIWA (range last 24 hours): COWS (range last 24 hours): Alcohol screening (AUDIT) completed -   AUDIT Score: 0 If applicable, date SBIRT discussed in treatment team AND documented:  
AUDIT Screen Score: AUDIT Score: 0 Document Brief Intervention (corresponds directly with the 5 A's, Ask, Advise, Assess, Assist, and Arrange): At- Risk Patients (Score 7-15 for women; 8-15 for men) Discuss concern patient is drinking at unhealthy levels known to increase risk of alcohol-related health problems. Is Patient ready to commit to change? If No: 
? Encourage reflection ? Discuss short term and long term health risks of consuming alcohol ? Barriers to change ? Reaffirm willingness to help / Educational materials provided If Yes: 
? Set goal 
? Plan 
? Educational materials provided Harmful use or Dependence (Score 16 or greater) ? Discuss short term and long term health risks of consuming alcohol ? Recommendations ? Negotiate drinking goal 
? Recommend addiction specialist/center ? Arrange follow-up appointments. Tobacco - patient is a smoker: Have You Used Tobacco in the Past 30 Days: No 
Illegal Drugs use: Have You Used Any Illegal Substances Over the Past 12 Months: No 
 
24 hour chart check complete: yes Patient goal(s) for today: Treatment team focus/goals:  
Progress note LOS:  4  Expected LOS:  
 
Financial concerns/prescription coverage:   
Family contact:      
Family requesting physician contact today:   
Discharge plan: Access to weapons :        
Outpatient provider(s):  
Patient's preferred phone number for follow up call :  
Patient's preferred e-mail address : 
Participating treatment team members: Carmine Reis, * (assigned SW),

## 2021-02-23 NOTE — BH NOTES
GROUP THERAPY PROGRESS NOTE    Patient is participating in Substance Abuse group. Group time: 50 minutes    Personal goal for participation: To identify and understand relapse triggers. Goal orientation: Personal    Group therapy participation: active    Therapeutic interventions reviewed and discussed: Group discussion on ways relapse triggers can affect individuals in recovery that encounter situations that they associate with past drug abuse. Each patient identified their own triggers, and the group discussed most common ones. Group members read aloud mistaken beliefs and myths about relapse and were given the truths behind it. Patients were encouraged to keep a checklist of behaviors and attitudes that are identified as indicators of an approaching relapse in order to interrupt the relapse dynamic and avoid it. Individuals were also given a sheet that included the most common relapse situations, these are called the The Unlucky 13, and members were to identify at least 5 of their own risks. Impression of participation: Wendy Smart actively participated in group. Patient shared his biggest warning sign is loneliness and feeling nataly a burden. Pt processed these feelings, and was cooperative in group. He reports feeling better and was in a euthymic mood.        Mei Evans, Supervisee in Social Work

## 2021-02-23 NOTE — BH NOTES
GROUP THERAPY PROGRESS NOTE    Patient is participating in a Self-Care group. Group time: 1 hour    Personal goal for participation: Discuss patients strengths, positive aspects of life and positive affirmations to increase self-esteem and promote healing. Goal orientation: Personal    Group therapy participation: active    Therapeutic interventions reviewed and discussed: Completion of Toot Your Own Horn activity sheet. Group discussion on personal patients strengths including accomplishments, positive characteristics and positive choices. Discussion regarding positive things others say about patients and positive words patient speak over themselves. Group discussed the benefit and power of positive thinking and self-talk and its role in improving mental health challenges. Impression of participation: Pt presented with euthymic mood, clear speech, logical thought process, proper insight and judgement. Pt calm and cooperative, interacted appropriately with staff and peers. Pt processed importance of high self-esteem and having positive thoughts. Pt completed activity, able to name numerous strengths including his love for God, his strong marriage, his happy and gentle personality, and his ability to think positively.     ABA He

## 2021-02-23 NOTE — PROGRESS NOTES
Nephrology Progress Note  [facility]  Date of Admission : 2/19/2021    CC: Follow up for ESRD       Assessment and Plan     ESRD-HD:  - Dialyzes MWF @ [facility] dialysis. F/b Dr Zoe Dumont   - HD tomorrow if here     Anemia in CKD:  - hgb stable  - BRITTANY w/ HD      Sec HPTH   - continue Home meds      HTN :  - continue Home meds      BiPolar disorder  - per psych team      Type II DM        Interval History:  Seen and examined. No complaints. HD yesterday, 1.5L UF. No cp, sob, n/v/d. Plans for d/c today. Current Medications: all current  Medications have been eviewed in EPIC  Review of Systems: Pertinent items are noted in HPI. Objective:  Vitals:    Vitals:    02/22/21 1945 02/22/21 2015 02/22/21 2030 02/23/21 0732   BP: 128/69 101/66 112/66 131/80   Pulse: 70 73 70 64   Resp: 18 18 18 16   Temp:    97.7 °F (36.5 °C)   SpO2:       Weight:       Height:         Intake and Output:  No intake/output data recorded. 02/21 1901 - 02/23 0700  In: -   Out: 1500     Physical Examination:  General: NAD,Conversant   Neck:  Supple, no mass  Resp:  Lungs CTA B/L, no wheezing , normal respiratory effort  CV:  RRR,  no murmur or rub, 2+ LE edema  GI:  Soft, NT, + Bowel sounds, no hepatosplenomegaly  Neurologic:  Non focal  Psych:             AAO x 3 appropriate affect   Skin:  No Rash  Access:           RUE AVF +thrill/bruit    []    High complexity decision making was performed  []    Patient is at high-risk of decompensation with multiple organ involvement    Lab Data Personally Reviewed: I have reviewed all the pertinent labs, microbiology data and radiology studies during assessment.     Recent Labs     02/22/21  1810      K 4.3      CO2 27   *  129*   BUN 55*   CREA 7.46*   CA 8.1*   PHOS 5.5*   ALB 3.4*     Recent Labs     02/22/21  1810   WBC 6.0   HGB 9.8*   HCT 29.0*        No results found for: SDES  Lab Results   Component Value Date/Time    Culture result: MRSA NOT PRESENT 02/19/2021 09:48 PM    Culture result:  02/19/2021 09:48 PM     Screening of patient nares for MRSA is for surveillance purposes and, if positive, to facilitate isolation considerations in high risk settings. It is not intended for automatic decolonization interventions per se as regimens are not sufficiently effective to warrant routine use. Recent Results (from the past 24 hour(s))   GLUCOSE, POC    Collection Time: 02/22/21 10:58 AM   Result Value Ref Range    Glucose (POC) 133 (H) 65 - 100 mg/dL    Performed by Lewis Sykes (PCT)    HEP B SURFACE AG    Collection Time: 02/22/21  6:10 PM   Result Value Ref Range    Hepatitis B surface Ag <0.10 Index    Hep B surface Ag Interp. Negative NEG     HEP B SURFACE AB    Collection Time: 02/22/21  6:10 PM   Result Value Ref Range    Hepatitis B surface Ab 13.47 mIU/mL    Hep B surface Ab Interp. REACTIVE (A) NR     CBC WITH AUTOMATED DIFF    Collection Time: 02/22/21  6:10 PM   Result Value Ref Range    WBC 6.0 4.1 - 11.1 K/uL    RBC 2.90 (L) 4.10 - 5.70 M/uL    HGB 9.8 (L) 12.1 - 17.0 g/dL    HCT 29.0 (L) 36.6 - 50.3 %    .0 (H) 80.0 - 99.0 FL    MCH 33.8 26.0 - 34.0 PG    MCHC 33.8 30.0 - 36.5 g/dL    RDW 13.7 11.5 - 14.5 %    PLATELET 299 091 - 226 K/uL    MPV 10.4 8.9 - 12.9 FL    NRBC 0.0 0  WBC    ABSOLUTE NRBC 0.00 0.00 - 0.01 K/uL    NEUTROPHILS 60 32 - 75 %    LYMPHOCYTES 23 12 - 49 %    MONOCYTES 11 5 - 13 %    EOSINOPHILS 4 0 - 7 %    BASOPHILS 1 0 - 1 %    IMMATURE GRANULOCYTES 1 (H) 0.0 - 0.5 %    ABS. NEUTROPHILS 3.6 1.8 - 8.0 K/UL    ABS. LYMPHOCYTES 1.4 0.8 - 3.5 K/UL    ABS. MONOCYTES 0.6 0.0 - 1.0 K/UL    ABS. EOSINOPHILS 0.2 0.0 - 0.4 K/UL    ABS. BASOPHILS 0.1 0.0 - 0.1 K/UL    ABS. IMM.  GRANS. 0.0 0.00 - 0.04 K/UL    DF AUTOMATED     GLUCOSE, FASTING    Collection Time: 02/22/21  6:10 PM   Result Value Ref Range    Glucose 136 (H) 65 - 100 MG/DL   LIPID PANEL    Collection Time: 02/22/21  6:10 PM   Result Value Ref Range    LIPID PROFILE          Cholesterol, total 119 <200 MG/DL    Triglyceride 341 (H) <150 MG/DL    HDL Cholesterol 25 MG/DL    LDL, calculated 25.8 0 - 100 MG/DL    VLDL, calculated 68.2 MG/DL    CHOL/HDL Ratio 4.8 0.0 - 5.0     RENAL FUNCTION PANEL    Collection Time: 02/22/21  6:10 PM   Result Value Ref Range    Sodium 137 136 - 145 mmol/L    Potassium 4.3 3.5 - 5.1 mmol/L    Chloride 100 97 - 108 mmol/L    CO2 27 21 - 32 mmol/L    Anion gap 10 5 - 15 mmol/L    Glucose 129 (H) 65 - 100 mg/dL    BUN 55 (H) 6 - 20 MG/DL    Creatinine 7.46 (H) 0.70 - 1.30 MG/DL    BUN/Creatinine ratio 7 (L) 12 - 20      GFR est AA 9 (L) >60 ml/min/1.73m2    GFR est non-AA 8 (L) >60 ml/min/1.73m2    Calcium 8.1 (L) 8.5 - 10.1 MG/DL    Phosphorus 5.5 (H) 2.6 - 4.7 MG/DL    Albumin 3.4 (L) 3.5 - 5.0 g/dL   TSH 3RD GENERATION    Collection Time: 02/22/21  6:10 PM   Result Value Ref Range    TSH 1.68 0.36 - 3.74 uIU/mL   GLUCOSE, POC    Collection Time: 02/22/21  8:42 PM   Result Value Ref Range    Glucose (POC) 142 (H) 65 - 100 mg/dL    Performed by Dylon [id number], POC    Collection Time: 02/23/21  7:24 AM   Result Value Ref Range    Glucose (POC) 77 65 - 100 mg/dL    Performed by Addy Bains (PCT)              Samantha Bergman MD  [address] [place] [address]  Phone - [phone]   Fax - [phone]  [email or web address]. [email or web address]

## 2021-02-23 NOTE — PROGRESS NOTES
GROUP THERAPY PROGRESS NOTE      Malachy Claude was present for medication education group. GROUP TIME: 55 minutes, Tuesday 14:00-14:55    PERSONAL GOAL FOR PARTICIPATION: To be present for group, participate in discussion and answer patient-directed questions. THERAPEUTIC INTERVENTIONS REVIEWED AND DISCUSSED: The following topic was presented: Depression. Signs and symptoms of depression were discussed (in regards to target symptoms for medications). The mechanism of action was discussed for the most common antidepressant classes. Expectations of medications were discussed including when to expect response, potential side effects and ways to prevent/remedy them. Patients were encouraged to advocate for themselves, create healthy therapeutic relationships with providers (particularly once outpatient) and to ask questions when needed    IMPRESSION OF PARTICIPATION: Patient was present for the entire group. He appeared drowsy but was able to participate. Asked questions regarding connection between depression and pituitary gland. He was respectful to his peers and group leaders.      Filemon Gilliam, PharmD, BCPP, [facility] Specialist, [facility]

## 2021-02-23 NOTE — SUICIDE SAFETY PLAN
SAFETY PLAN    A suicide Safety Plan is a document that supports someone when they are having thoughts of suicide. Warning Signs that indicate a suicidal crisis may be developing: What (situations, thoughts, feelings, body sensations, behaviors, etc.) do you experience that lets you know you are beginning to think about suicide? 1. Dialysis  2. Nighttime  3. Loneliness    Internal Coping Strategies:  What things can I do (relaxation techniques, hobbies, physical activities, etc.) to take my mind off my problems without contacting another person? 1. Comply with treatment  2. Distract myself  3. Spend time with my wife    People and social settings that provide distraction: Who can I call or where can I go to distract me? 1. Name: Maxine Oliveira                          Phone: In phone  2. Name: Shireen Ganser Stump   Phone: In phone   3. Place: Shopping            4. Place: Traveling    People whom I can ask for help: Who can I call when I need help - for example, friends, family, clergy, someone else? 1. Name: Maxine Oliveira                                 Phone: In phone   2. Name:Pastor Nghia Durbin                    Phone: In phone       Professionals or [address] [place] [address] I can contact during a crisis: Who can I call for help - for example, my doctor, my psychiatrist, my psychologist, a mental health provider, a suicide hotline? 1. Clinician Name: Burnie Saint, NP with [facility]    Phone:       Clinician Pager or Emergency Contact #: [phone]      2. Clinician Name:    Phone:       Clinician Pager or Emergency Contact #:     3. Suicide Prevention Lifeline: 7-594-974-TALK (9884)    4. [address] [place] [address] Emergency Services -  for example, [facility] suicide hotline, [facility] Hotline: 211      Emergency Services Address: [address] [place] [address]      Emergency Services Phone: [phone]    Making the environment safe:  How can I make my environment (house/apartment/living space) safer? For example, can I remove guns, medications, and other items? 1. Remove old meds  2.  Put emergency contacts in phone

## 2022-03-18 PROBLEM — F32.9 MAJOR DEPRESSIVE DISORDER: Status: ACTIVE | Noted: 2021-02-19

## 2023-05-15 RX ORDER — ASPIRIN 81 MG/1
81 TABLET ORAL DAILY
COMMUNITY

## 2023-05-15 RX ORDER — CALCIUM ACETATE 667 MG/1
667 TABLET ORAL
COMMUNITY

## 2023-05-15 RX ORDER — MIDODRINE HYDROCHLORIDE 10 MG/1
TABLET ORAL 3 TIMES DAILY PRN
COMMUNITY

## 2023-05-15 RX ORDER — TORSEMIDE 100 MG/1
100 TABLET ORAL DAILY
COMMUNITY

## 2023-05-15 RX ORDER — TRAZODONE HYDROCHLORIDE 50 MG/1
50 TABLET ORAL
COMMUNITY
Start: 2021-02-23

## 2023-05-15 RX ORDER — ESCITALOPRAM OXALATE 20 MG/1
20 TABLET ORAL DAILY
COMMUNITY
Start: 2021-02-24

## 2023-05-15 RX ORDER — ATORVASTATIN CALCIUM 80 MG/1
80 TABLET, FILM COATED ORAL DAILY
COMMUNITY

## 2023-05-15 RX ORDER — GABAPENTIN 100 MG/1
100 CAPSULE ORAL 2 TIMES DAILY
COMMUNITY

## 2023-05-15 RX ORDER — HYDROCORTISONE 10 MG/1
5 TABLET ORAL EVERY EVENING
COMMUNITY

## 2023-05-15 RX ORDER — GABAPENTIN 300 MG/1
300 CAPSULE ORAL NIGHTLY
COMMUNITY